# Patient Record
Sex: FEMALE | Race: OTHER | NOT HISPANIC OR LATINO
[De-identification: names, ages, dates, MRNs, and addresses within clinical notes are randomized per-mention and may not be internally consistent; named-entity substitution may affect disease eponyms.]

---
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
## 2022-01-09 ENCOUNTER — NON-APPOINTMENT (OUTPATIENT)
Age: 22
End: 2022-01-09

## 2022-01-10 ENCOUNTER — NON-APPOINTMENT (OUTPATIENT)
Age: 22
End: 2022-01-10

## 2022-01-11 ENCOUNTER — NON-APPOINTMENT (OUTPATIENT)
Age: 22
End: 2022-01-11

## 2022-01-11 ENCOUNTER — LABORATORY RESULT (OUTPATIENT)
Age: 22
End: 2022-01-11

## 2022-01-11 ENCOUNTER — ASOB RESULT (OUTPATIENT)
Age: 22
End: 2022-01-11

## 2022-01-11 ENCOUNTER — APPOINTMENT (OUTPATIENT)
Dept: OBGYN | Facility: CLINIC | Age: 22
End: 2022-01-11
Payer: MEDICAID

## 2022-01-11 ENCOUNTER — APPOINTMENT (OUTPATIENT)
Dept: ANTEPARTUM | Facility: CLINIC | Age: 22
End: 2022-01-11
Payer: MEDICAID

## 2022-01-11 PROCEDURE — 99213 OFFICE O/P EST LOW 20 MIN: CPT

## 2022-01-11 PROCEDURE — 76813 OB US NUCHAL MEAS 1 GEST: CPT

## 2022-01-11 RX ORDER — PRENATAL VIT 49/IRON FUM/FOLIC 6.75-0.2MG
TABLET ORAL
Refills: 0 | Status: ACTIVE | COMMUNITY

## 2022-01-14 LAB
AMPHET UR-MCNC: NEGATIVE
BARBITURATES UR-MCNC: NEGATIVE
BENZODIAZ UR-MCNC: NEGATIVE
COCAINE METAB.OTHER UR-MCNC: NEGATIVE
CREATININE, URINE: 200.6 MG/DL
METHADONE UR-MCNC: NEGATIVE
METHAQUALONE UR-MCNC: NEGATIVE
OPIATES UR-MCNC: NEGATIVE
PCP UR-MCNC: NEGATIVE
PROPOXYPH UR QL: NEGATIVE
THC UR QL: NEGATIVE

## 2022-01-21 LAB
CLARI ADDITIONAL INFO: NORMAL
CLARI CHROMOSOME 13: NORMAL
CLARI CHROMOSOME 18: NORMAL
CLARI CHROMOSOME 21: NORMAL
CLARI SEX CHROMOSOMES: NORMAL
CLARITEST NIPT: NORMAL
MATERNAL WEIGHT (LBS):: NORMAL
PLEASE INCLUDE GENDER RESULTS ON THIS REPORT:: NORMAL
TYPE OF PREGNANCY:: NORMAL

## 2022-01-22 ENCOUNTER — LABORATORY RESULT (OUTPATIENT)
Age: 22
End: 2022-01-22

## 2022-01-24 LAB
ABO + RH PNL BLD: NORMAL
APPEARANCE: ABNORMAL
B19V IGG SER QL IA: 4.35 INDEX
B19V IGG+IGM SER-IMP: NORMAL
B19V IGG+IGM SER-IMP: POSITIVE
B19V IGM FLD-ACNC: 0.15 INDEX
B19V IGM SER-ACNC: NEGATIVE
BASOPHILS # BLD AUTO: 0.03 K/UL
BASOPHILS NFR BLD AUTO: 0.4 %
BILIRUBIN URINE: NEGATIVE
BLOOD URINE: NEGATIVE
COLOR: YELLOW
EOSINOPHIL # BLD AUTO: 0.28 K/UL
EOSINOPHIL NFR BLD AUTO: 3.8 %
GLUCOSE BS SERPL-MCNC: 77 MG/DL
GLUCOSE QUALITATIVE U: NEGATIVE
HBV SURFACE AG SER QL: NONREACTIVE
HCT VFR BLD CALC: 37.3 %
HCV AB SER QL: NONREACTIVE
HCV S/CO RATIO: 0.1 S/CO
HGB A MFR BLD: 97.2 %
HGB A2 MFR BLD: 2.8 %
HGB BLD-MCNC: 12.4 G/DL
HGB FRACT BLD-IMP: NORMAL
IMM GRANULOCYTES NFR BLD AUTO: 0.5 %
KETONES URINE: NORMAL
LEUKOCYTE ESTERASE URINE: ABNORMAL
LYMPHOCYTES # BLD AUTO: 1.42 K/UL
LYMPHOCYTES NFR BLD AUTO: 19.3 %
MAN DIFF?: NORMAL
MCHC RBC-ENTMCNC: 30.9 PG
MCHC RBC-ENTMCNC: 33.2 GM/DL
MCV RBC AUTO: 93 FL
MEV IGG FLD QL IA: 95.9 AU/ML
MEV IGG+IGM SER-IMP: POSITIVE
MONOCYTES # BLD AUTO: 0.63 K/UL
MONOCYTES NFR BLD AUTO: 8.5 %
NEUTROPHILS # BLD AUTO: 4.97 K/UL
NEUTROPHILS NFR BLD AUTO: 67.5 %
NITRITE URINE: NEGATIVE
PH URINE: 6.5
PLATELET # BLD AUTO: 210 K/UL
PROTEIN URINE: ABNORMAL
RBC # BLD: 4.01 M/UL
RBC # FLD: 13 %
RUBV IGG FLD-ACNC: 4.4 INDEX
RUBV IGG SER-IMP: POSITIVE
RUBV IGM FLD-ACNC: <20 AU/ML
SPECIFIC GRAVITY URINE: 1.02
T3FREE SERPL-MCNC: 2.96 PG/ML
T4 FREE SERPL-MCNC: 1.2 NG/DL
TSH SERPL-ACNC: 0.81 UIU/ML
UROBILINOGEN URINE: NORMAL
VZV AB TITR SER: POSITIVE
VZV IGG SER IF-ACNC: 446.6 INDEX
VZV IGM SER IF-ACNC: <0.91 INDEX
WBC # FLD AUTO: 7.37 K/UL

## 2022-01-26 LAB
AR GENE MUT ANL BLD/T: NORMAL
MEV IGM SER QL: <0.91 ISR
RPR SER-TITR: NORMAL

## 2022-02-01 LAB — CFTR MUT TESTED BLD/T: NEGATIVE

## 2022-02-04 LAB
11-BETA-HYDROXYLASE-DEFICIENT CONGENITAL ADRENAL HYPERPLASIA: NEGATIVE
6-PYRUVOYL-TETRAHYDROPTERIN SYNTHASE DEFICIENCY: NEGATIVE
ABCC8-RELATED HYPERINSULINISM: NEGATIVE
ADENOSINE DEAMINASE DEFICIENCY: NEGATIVE
ALPHA THALASSEMIA: NEGATIVE
ALPHA-MANNOSIDOSIS: NEGATIVE
ALSTROM SYNDROME: NEGATIVE
AMT-RELATED GLYCINE ENCEPHALOPATHY: NEGATIVE
ANDERMANN SYNDROME: NEGATIVE
AR GENE MUT ANL BLD/T: NEGATIVE
ARGININEMIA: NEGATIVE
ARGININOSUCCINIC ACIDURIA: NEGATIVE
ARSACS: NEGATIVE
ASPARTYLGLYCOSAMINURIA: NEGATIVE
ATAXIA WITH VITAMIN E DEFICIENCY: NEGATIVE
ATAXIA-TELANGIECTASIA: NEGATIVE
ATP7A-RELATED DISORDERS: NEGATIVE
AUTOSOMAL RECESSIVE OSTEOPETROSIS TYPE 1: NEGATIVE
AUTOSOMAL RECESSIVE POLYCYSTIC KIDNEY DISEASE: NEGATIVE
BARDET-BIEDL SYNDROME, BBS1-RELATED: NEGATIVE
BARDET-BIEDL SYNDROME, BBS10-RELATED: NEGATIVE
BARDET-BIEDL SYNDROME, BBS12-RELATED: NEGATIVE
BARDET-BIEDL SYNDROME, BBS2-RELATED: NEGATIVE
BIOTINIDASE DEFICIENCY: NEGATIVE
BLOOM SYNDROME: NEGATIVE
CALPAINOPATHY: NEGATIVE
CANAVAN DISEASE: NEGATIVE
CARBAMOYLPHOSPHATE SYNTHETASE I DEFICIENCY: NEGATIVE
CARNITINE PALMITOYLTRANSFERASE IA DEFICIENCY: NEGATIVE
CARNITINE PALMITOYLTRANSFERASE II DEFICIENCY: NEGATIVE
CARTILAGE-HAIR HYPOPLASIA: NEGATIVE
CEREBROTENDINOUS XANTHOMATOSIS: NEGATIVE
CITRULLINEMIA TYPE 1: NEGATIVE
CLN3-RELATED NEURONAL CEROID LIPOFUSCINOSIS: NEGATIVE
CLN5-RELATED NEURONAL CEROID LIPOFUSCINOSIS: NEGATIVE
CLN6-RELATED NEURONAL CEROID LIPOFUSCINOSIS: NEGATIVE
COHEN SYNDROME: NEGATIVE
COL4A3-RELATED ALPORT SYNDROME: NEGATIVE
COL4A4-RELATED ALPORT SYNDROME: NEGATIVE
CONGENITAL ADRENAL HYPERPLASIA: NEGATIVE
CONGENITAL DISORDER OF GLYCOSYLATION TYPE IA: NEGATIVE
CONGENITAL DISORDER OF GLYCOSYLATION TYPE IB: NEGATIVE
CONGENITAL DISORDER OF GLYCOSYLATION TYPE IC: NEGATIVE
CONGENITAL FINNISH NEPHROSIS: NEGATIVE
COSTEFF OPTIC ATROPHY SYNDROME: NEGATIVE
CYSTIC FIBROSIS: NEGATIVE
CYSTINOSIS: NEGATIVE
D-BIFUNCTIONAL PROTEIN DEFICIENCY: NEGATIVE
DELTA-SARCOGLYCANOPATHY: NEGATIVE
DYS GENE MUT TESTED BLD/T: NEGATIVE
DYSFERLINOPATHY: NEGATIVE
DYSTROPHINOPATHY (INCLUDING DUCHENNE/BECKER MUSCULAR DYSTROP: NEGATIVE
ERCC6-RELATED DISORDERS: NEGATIVE
ERCC8-RELATED DISORDERS: NEGATIVE
EVC-RELATED ELLIS-VAN CREVELD SYNDROME: NEGATIVE
EVC2-RELATED ELLIS-VAN CREVELD SYNDROME: NEGATIVE
FABRY DISEASE: NEGATIVE
FAMILIAL MEDITERRANEAN FEVER: NEGATIVE
FANCONI ANEMIA COMPLEMENTATION GROUP A: NEGATIVE
FANCONI ANEMIA TYPE C: NEGATIVE
FKRP-RELATED DISORDERS: NEGATIVE
FRAGILE X SYNDROME: NEGATIVE
GALACTOKINASE DEFICIENCY: NEGATIVE
GALACTOSEMIA: NEGATIVE
GAMMA-SARCOGLYCANOPATHY: NEGATIVE
GAUCHER DISEASE: NEGATIVE
GJB2-RELATED DFNB1 NONSYNDROMIC HEARING LOSS AND DEAFNESS: NEGATIVE
GLB1-RELATED DISORDERS: NEGATIVE
GLDC-RELATED GLYCINE ENCEPHALOPATHY: NEGATIVE
GLUTARIC ACIDEMIA TYPE 1: NEGATIVE
GLYCOGEN STORAGE DISEASE TYPE IA: NEGATIVE
GLYCOGEN STORAGE DISEASE TYPE IB: NEGATIVE
GLYCOGEN STORAGE DISEASE TYPE III: NEGATIVE
GNPTAB-RELATED DISORDERS: NEGATIVE
GRACILE SYNDROME: NEGATIVE
HB BETA CHAIN-RELATED HEMOGLOBINOPATHY: NEGATIVE
HEREDITARY FRUCTOSE INTOLERANCE: NEGATIVE
HERLITZ JUNCTIONAL EPIDERMOLYSIS BULLOSA, LAMA3-RELATED: NEGATIVE
HERLITZ JUNCTIONAL EPIDERMOLYSIS BULLOSA, LAMB3-RELATED: NEGATIVE
HERLITZ JUNCTIONAL EPIDERMOLYSIS BULLOSA, LAMC2-RELATED: NEGATIVE
HEXOSAMINIDASE A DEFICIENCY: NEGATIVE
HMG-COA LYASE DEFICIENCY: NEGATIVE
HOLOCARBOXYLASE SYNTHETASE DEFICIENCY: NEGATIVE
HOMOCYSTINURIA / CYSTATHIONINE BETA-SYNTHASE DEFICIENCY: NEGATIVE
HURLER SYNDROME: NEGATIVE
HYDROLETHALUS SYNDROME: NEGATIVE
HYPOPHOSPHATASIA, AUTOSOMAL RECESSIVE: NEGATIVE
INCLUSION BODY MYOPATHY 2: NEGATIVE
ISOVALERIC ACIDEMIA: NEGATIVE
JOUBERT SYNDROME 2: NEGATIVE
KCNJ11-RELATED FAMILIAL HYPERINSULINISM: NEGATIVE
KRABBE DISEASE: NEGATIVE
LAMA2-RELATED MUSCULAR DYSTROPHY: NEGATIVE
LEIGH SYNDROME, FRENCH-CANADIAN TYPE: NEGATIVE
LIMB-GIRDLE MUSCULAR DYSTROPHY TYPE 2D: NEGATIVE
LIMB-GIRDLE MUSCULAR DYSTROPHY TYPE 2E: NEGATIVE
LIPOAMIDE DEHYDROGENASE DEFICIENCY: NEGATIVE
LIPOID CONGENITAL ADRENAL HYPERPLASIA: NEGATIVE
LONG CHAIN 3-HYDROXYACYL-COA DEHYDROGENASE DEFICIENCY: NEGATIVE
LYSOSOMAL ACID LIPASE DEFICIENCY: NEGATIVE
MAPLE SYRUP URINE DISEASE TYPE 1B: NEGATIVE
MAPLE SYRUP URINE DISEASE TYPE IA: NEGATIVE
MAPLE SYRUP URINE DISEASE TYPE II: NEGATIVE
MEDIUM CHAIN ACYL-COA DEHYDROGENASE DEFICIENCY: NEGATIVE
MEGALENCEPHALIC LEUKOENCEPHALOPATHY WITH SUBCORTICAL CYSTS: NEGATIVE
METACHROMATIC LEUKODYSTROPHY: NEGATIVE
METHYLMALONIC ACIDEMIA, CBLA TYPE: NEGATIVE
METHYLMALONIC ACIDEMIA, CBLB TYPE: NEGATIVE
METHYLMALONIC ACIDURIA AND HOMOCYSTINURIA, CBLC TYPE: NEGATIVE
MKS1-RELATED DISORDERS: NEGATIVE
MUCOLIPIDOSIS III GAMMA: NEGATIVE
MUCOLIPIDOSIS IV: NEGATIVE
MUCOPOLYSACCHARIDOSIS TYPE II: NEGATIVE
MUCOPOLYSACCHARIDOSIS TYPE IIIA: NEGATIVE
MUCOPOLYSACCHARIDOSIS TYPE IIIB: NEGATIVE
MUCOPOLYSACCHARIDOSIS TYPE IIIC: NEGATIVE
MUSCLE-EYE-BRAIN DISEASE: NEGATIVE
MUT-RELATED METHYLMALONIC ACIDEMIA: NEGATIVE
MYO7A-RELATED DISORDERS: NEGATIVE
NEB-RELATED NEMALINE MYOPATHY: NEGATIVE
NIEMANN-PICK DISEASE TYPE C2: NEGATIVE
NIEMANN-PICK DISEASE TYPE C: NEGATIVE
NIEMANN-PICK DISEASE, SMPD1-ASSOCIATED: NEGATIVE
NIJMEGEN BREAKAGE SYNDROME: NEGATIVE
NORTHERN EPILEPSY: NEGATIVE
ORNITHINE TRANSCARBAMYLASE DEFICIENCY: NEGATIVE
PCCA-RELATED PROPIONIC ACIDEMIA: NEGATIVE
PCCB-RELATED PROPIONIC ACIDEMIA: NEGATIVE
PENDRED SYNDROME: NEGATIVE
PEROXISOME BIOGENESIS DISORDER TYPE 3: NEGATIVE
PEROXISOME BIOGENESIS DISORDER TYPE 4: NEGATIVE
PEROXISOME BIOGENESIS DISORDER TYPE 5: NEGATIVE
PEROXISOME BIOGENESIS DISORDER TYPE 6: NEGATIVE
PEX1-RELATED ZELLWEGER SYNDROME SPECTRUM: NEGATIVE
PHENYLALANINE HYDROXYLASE DEFICIENCY: NEGATIVE
POLYGLANDULAR AUTOIMMUNE SYNDROME TYPE 1: NEGATIVE
POMPE DISEASE: NEGATIVE
PPT1-RELATED NEURONAL CEROID LIPOFUSCINOSIS: NEGATIVE
PRIMARY CARNITINE DEFICIENCY: NEGATIVE
PRIMARY HYPEROXALURIA TYPE 1: NEGATIVE
PRIMARY HYPEROXALURIA TYPE 2: NEGATIVE
PRIMARY HYPEROXALURIA TYPE 3: NEGATIVE
PROP1-RELATED COMBINED PITUITARY HORMONE DEFICIENCY: NEGATIVE
PYCNODYSOSTOSIS: NEGATIVE
PYRUVATE CARBOXYLASE DEFICIENCY: NEGATIVE
RHIZOMELIC CHONDRODYSPLASIA PUNCTATA TYPE 1: NEGATIVE
RTEL1-RELATED DISORDERS: NEGATIVE
SALLA DISEASE: NEGATIVE
SANDHOFF DISEASE: NEGATIVE
SEGAWA SYNDROME: NEGATIVE
SHORT CHAIN ACYL-COA DEHYDROGENASE DEFICIENCY: NEGATIVE
SJOGREN-LARSSON SYNDROME: NEGATIVE
SMITH-LEMLI-OPITZ SYNDROME: NEGATIVE
SPASTIC PARAPLEGIA TYPE 15: NEGATIVE
SPONDYLOTHORACIC DYSOSTOSIS: NEGATIVE
STEROID-RESISTANT NEPHROTIC SYNDROME: NEGATIVE
SULFATE TRANSPORTER-RELATED OSTEOCHONDRODYSPLASIA: NEGATIVE
TGM1-RELATED AUTOSOMAL RECESSIVE CONGENITAL ICHTHYOSIS: NEGATIVE
TPP1-RELATED NEURONAL CEROID LIPOFUSCINOSIS: NEGATIVE
TYROSINEMIA TYPE I: NEGATIVE
TYROSINEMIA TYPE II: NEGATIVE
USH1C-RELATED DISORDERS: NEGATIVE
USH2A-RELATED DISORDERS: NEGATIVE
USHER SYNDROME TYPE 1F: NEGATIVE
USHER SYNDROME TYPE 3: NEGATIVE
VERY LONG CHAIN ACYL-COA DEHYDROGENASE DEFICIENCY: NEGATIVE
WALKER-WARBURG SYNDROME: NEGATIVE
WILSON DISEASE: NEGATIVE
X-LINKED ADRENOLEUKODYSTROPHY: NEGATIVE
X-LINKED ALPORT SYNDROME: NEGATIVE
X-LINKED CONGENITAL ADRENAL HYPOPLASIA: NEGATIVE
X-LINKED JUVENILE RETINOSCHISIS: NEGATIVE
X-LINKED MYOTUBULAR MYOPATHY: NEGATIVE
X-LINKED SEVERE COMBINED IMMUNODEFICIENCY: NEGATIVE
XERODERMA PIGMENTOSUM GROUP A: NEGATIVE
XERODERMA PIGMENTOSUM GROUP C: NEGATIVE

## 2022-02-07 ENCOUNTER — NON-APPOINTMENT (OUTPATIENT)
Age: 22
End: 2022-02-07

## 2022-02-08 ENCOUNTER — APPOINTMENT (OUTPATIENT)
Dept: OBGYN | Facility: CLINIC | Age: 22
End: 2022-02-08
Payer: MEDICAID

## 2022-02-08 ENCOUNTER — NON-APPOINTMENT (OUTPATIENT)
Age: 22
End: 2022-02-08

## 2022-02-08 PROCEDURE — 99213 OFFICE O/P EST LOW 20 MIN: CPT

## 2022-02-15 ENCOUNTER — ASOB RESULT (OUTPATIENT)
Age: 22
End: 2022-02-15

## 2022-02-15 ENCOUNTER — APPOINTMENT (OUTPATIENT)
Dept: ANTEPARTUM | Facility: CLINIC | Age: 22
End: 2022-02-15
Payer: MEDICAID

## 2022-02-15 PROCEDURE — 76805 OB US >/= 14 WKS SNGL FETUS: CPT

## 2022-03-01 ENCOUNTER — NON-APPOINTMENT (OUTPATIENT)
Age: 22
End: 2022-03-01

## 2022-03-01 ENCOUNTER — APPOINTMENT (OUTPATIENT)
Dept: ANTEPARTUM | Facility: CLINIC | Age: 22
End: 2022-03-01
Payer: MEDICAID

## 2022-03-01 ENCOUNTER — ASOB RESULT (OUTPATIENT)
Age: 22
End: 2022-03-01

## 2022-03-01 PROCEDURE — 76816 OB US FOLLOW-UP PER FETUS: CPT

## 2022-03-07 ENCOUNTER — NON-APPOINTMENT (OUTPATIENT)
Age: 22
End: 2022-03-07

## 2022-03-08 ENCOUNTER — APPOINTMENT (OUTPATIENT)
Dept: OBGYN | Facility: CLINIC | Age: 22
End: 2022-03-08
Payer: MEDICAID

## 2022-03-08 LAB
GLUCOSE UR-MCNC: NEGATIVE
PROT UR STRIP-MCNC: NEGATIVE

## 2022-03-08 PROCEDURE — 99213 OFFICE O/P EST LOW 20 MIN: CPT

## 2022-04-05 ENCOUNTER — NON-APPOINTMENT (OUTPATIENT)
Age: 22
End: 2022-04-05

## 2022-04-05 ENCOUNTER — APPOINTMENT (OUTPATIENT)
Dept: OBGYN | Facility: CLINIC | Age: 22
End: 2022-04-05
Payer: MEDICAID

## 2022-04-05 LAB
GLUCOSE UR-MCNC: NEGATIVE
PROT UR STRIP-MCNC: NORMAL

## 2022-04-05 PROCEDURE — 99213 OFFICE O/P EST LOW 20 MIN: CPT | Mod: TH

## 2022-05-02 ENCOUNTER — NON-APPOINTMENT (OUTPATIENT)
Age: 22
End: 2022-05-02

## 2022-05-03 ENCOUNTER — APPOINTMENT (OUTPATIENT)
Dept: OBGYN | Facility: CLINIC | Age: 22
End: 2022-05-03
Payer: MEDICAID

## 2022-05-03 ENCOUNTER — NON-APPOINTMENT (OUTPATIENT)
Age: 22
End: 2022-05-03

## 2022-05-03 LAB
BASOPHILS # BLD AUTO: 0.05 K/UL
BASOPHILS NFR BLD AUTO: 0.6 %
EOSINOPHIL # BLD AUTO: 0.22 K/UL
EOSINOPHIL NFR BLD AUTO: 2.6 %
GLUCOSE 1H P 50 G GLC PO SERPL-MCNC: 66 MG/DL
HCT VFR BLD CALC: 32.9 %
HGB BLD-MCNC: 10.4 G/DL
IMM GRANULOCYTES NFR BLD AUTO: 1.3 %
LYMPHOCYTES # BLD AUTO: 1.41 K/UL
LYMPHOCYTES NFR BLD AUTO: 16.9 %
MAN DIFF?: NORMAL
MCHC RBC-ENTMCNC: 29.6 PG
MCHC RBC-ENTMCNC: 31.6 GM/DL
MCV RBC AUTO: 93.7 FL
MONOCYTES # BLD AUTO: 0.77 K/UL
MONOCYTES NFR BLD AUTO: 9.3 %
NEUTROPHILS # BLD AUTO: 5.76 K/UL
NEUTROPHILS NFR BLD AUTO: 69.3 %
PLATELET # BLD AUTO: 220 K/UL
RBC # BLD: 3.51 M/UL
RBC # FLD: 12.8 %
WBC # FLD AUTO: 8.32 K/UL

## 2022-05-03 PROCEDURE — 99213 OFFICE O/P EST LOW 20 MIN: CPT | Mod: TH

## 2022-05-03 RX ORDER — FERROUS SULFATE 137(45) MG
142 (45 FE) TABLET, EXTENDED RELEASE ORAL
Qty: 90 | Refills: 0 | Status: ACTIVE | COMMUNITY
Start: 2022-05-03

## 2022-05-03 RX ORDER — MULTIVIT-MIN/IRON/FOLIC ACID/K 18-600-40
500 CAPSULE ORAL
Refills: 0 | Status: ACTIVE | COMMUNITY
Start: 2022-05-03

## 2022-05-24 ENCOUNTER — APPOINTMENT (OUTPATIENT)
Dept: ANTEPARTUM | Facility: CLINIC | Age: 22
End: 2022-05-24
Payer: MEDICAID

## 2022-05-24 ENCOUNTER — ASOB RESULT (OUTPATIENT)
Age: 22
End: 2022-05-24

## 2022-05-24 ENCOUNTER — APPOINTMENT (OUTPATIENT)
Dept: OBGYN | Facility: CLINIC | Age: 22
End: 2022-05-24
Payer: MEDICAID

## 2022-05-24 ENCOUNTER — NON-APPOINTMENT (OUTPATIENT)
Age: 22
End: 2022-05-24

## 2022-05-24 LAB
GLUCOSE UR-MCNC: NORMAL
PROT UR STRIP-MCNC: NORMAL

## 2022-05-24 PROCEDURE — 76820 UMBILICAL ARTERY ECHO: CPT

## 2022-05-24 PROCEDURE — 99213 OFFICE O/P EST LOW 20 MIN: CPT | Mod: TH

## 2022-05-24 PROCEDURE — 76819 FETAL BIOPHYS PROFIL W/O NST: CPT

## 2022-05-24 PROCEDURE — 76821 MIDDLE CEREBRAL ARTERY ECHO: CPT

## 2022-05-24 PROCEDURE — 76816 OB US FOLLOW-UP PER FETUS: CPT

## 2022-05-27 ENCOUNTER — NON-APPOINTMENT (OUTPATIENT)
Age: 22
End: 2022-05-27

## 2022-05-31 ENCOUNTER — APPOINTMENT (OUTPATIENT)
Dept: ANTEPARTUM | Facility: CLINIC | Age: 22
End: 2022-05-31
Payer: MEDICAID

## 2022-05-31 ENCOUNTER — ASOB RESULT (OUTPATIENT)
Age: 22
End: 2022-05-31

## 2022-05-31 PROCEDURE — ZZZZZ: CPT

## 2022-05-31 PROCEDURE — 76818 FETAL BIOPHYS PROFILE W/NST: CPT

## 2022-06-07 ENCOUNTER — NON-APPOINTMENT (OUTPATIENT)
Age: 22
End: 2022-06-07

## 2022-06-07 ENCOUNTER — APPOINTMENT (OUTPATIENT)
Dept: ANTEPARTUM | Facility: CLINIC | Age: 22
End: 2022-06-07
Payer: MEDICAID

## 2022-06-07 ENCOUNTER — ASOB RESULT (OUTPATIENT)
Age: 22
End: 2022-06-07

## 2022-06-07 ENCOUNTER — APPOINTMENT (OUTPATIENT)
Dept: OBGYN | Facility: CLINIC | Age: 22
End: 2022-06-07
Payer: MEDICAID

## 2022-06-07 LAB
GLUCOSE UR-MCNC: NEGATIVE
PROT UR STRIP-MCNC: NEGATIVE

## 2022-06-07 PROCEDURE — 76818 FETAL BIOPHYS PROFILE W/NST: CPT

## 2022-06-07 PROCEDURE — 76820 UMBILICAL ARTERY ECHO: CPT

## 2022-06-07 PROCEDURE — ZZZZZ: CPT

## 2022-06-07 PROCEDURE — 76821 MIDDLE CEREBRAL ARTERY ECHO: CPT

## 2022-06-07 PROCEDURE — 99213 OFFICE O/P EST LOW 20 MIN: CPT | Mod: TH

## 2022-06-14 ENCOUNTER — ASOB RESULT (OUTPATIENT)
Age: 22
End: 2022-06-14

## 2022-06-14 ENCOUNTER — APPOINTMENT (OUTPATIENT)
Dept: ANTEPARTUM | Facility: CLINIC | Age: 22
End: 2022-06-14

## 2022-06-17 ENCOUNTER — NON-APPOINTMENT (OUTPATIENT)
Age: 22
End: 2022-06-17

## 2022-06-20 ENCOUNTER — NON-APPOINTMENT (OUTPATIENT)
Age: 22
End: 2022-06-20

## 2022-06-21 ENCOUNTER — APPOINTMENT (OUTPATIENT)
Dept: ANTEPARTUM | Facility: CLINIC | Age: 22
End: 2022-06-21

## 2022-06-21 ENCOUNTER — LABORATORY RESULT (OUTPATIENT)
Age: 22
End: 2022-06-21

## 2022-06-21 ENCOUNTER — APPOINTMENT (OUTPATIENT)
Dept: OBGYN | Facility: CLINIC | Age: 22
End: 2022-06-21
Payer: MEDICAID

## 2022-06-21 PROCEDURE — 99213 OFFICE O/P EST LOW 20 MIN: CPT | Mod: TH

## 2022-06-22 LAB
GLUCOSE UR-MCNC: NEGATIVE
LEUKOCYTE ESTERASE UR QL STRIP: NORMAL
NITRITE UR QL STRIP: NEGATIVE
PROT UR STRIP-MCNC: NORMAL

## 2022-06-24 LAB
GP B STREP DNA SPEC QL NAA+PROBE: DETECTED
GP B STREP DNA SPEC QL NAA+PROBE: NORMAL
SOURCE GBS: NORMAL

## 2022-06-28 ENCOUNTER — APPOINTMENT (OUTPATIENT)
Dept: ANTEPARTUM | Facility: CLINIC | Age: 22
End: 2022-06-28

## 2022-06-28 ENCOUNTER — ASOB RESULT (OUTPATIENT)
Age: 22
End: 2022-06-28

## 2022-06-28 PROCEDURE — 76816 OB US FOLLOW-UP PER FETUS: CPT

## 2022-06-29 ENCOUNTER — NON-APPOINTMENT (OUTPATIENT)
Age: 22
End: 2022-06-29

## 2022-06-30 ENCOUNTER — NON-APPOINTMENT (OUTPATIENT)
Age: 22
End: 2022-06-30

## 2022-07-05 ENCOUNTER — APPOINTMENT (OUTPATIENT)
Dept: ANTEPARTUM | Facility: CLINIC | Age: 22
End: 2022-07-05

## 2022-07-05 ENCOUNTER — APPOINTMENT (OUTPATIENT)
Dept: OBGYN | Facility: CLINIC | Age: 22
End: 2022-07-05

## 2022-07-05 VITALS
BODY MASS INDEX: 30.36 KG/M2 | WEIGHT: 165 LBS | DIASTOLIC BLOOD PRESSURE: 60 MMHG | SYSTOLIC BLOOD PRESSURE: 116 MMHG | HEIGHT: 62 IN

## 2022-07-05 PROCEDURE — 99213 OFFICE O/P EST LOW 20 MIN: CPT | Mod: TH

## 2022-07-12 ENCOUNTER — APPOINTMENT (OUTPATIENT)
Dept: ANTEPARTUM | Facility: CLINIC | Age: 22
End: 2022-07-12

## 2022-07-12 ENCOUNTER — APPOINTMENT (OUTPATIENT)
Dept: OBGYN | Facility: CLINIC | Age: 22
End: 2022-07-12

## 2022-07-12 VITALS
HEIGHT: 62 IN | WEIGHT: 161 LBS | SYSTOLIC BLOOD PRESSURE: 118 MMHG | DIASTOLIC BLOOD PRESSURE: 60 MMHG | BODY MASS INDEX: 29.63 KG/M2

## 2022-07-12 LAB
CLARITY UR: CLEAR
COLLECTION METHOD: NORMAL
GLUCOSE UR-MCNC: NEGATIVE
LEUKOCYTE ESTERASE UR QL STRIP: NORMAL
NITRITE UR QL STRIP: NEGATIVE
PROT UR STRIP-MCNC: NORMAL

## 2022-07-12 PROCEDURE — 99213 OFFICE O/P EST LOW 20 MIN: CPT | Mod: TH

## 2022-07-12 RX ORDER — TERCONAZOLE 8 MG/G
0.8 CREAM VAGINAL
Qty: 1 | Refills: 0 | Status: COMPLETED | COMMUNITY
Start: 2022-02-08 | End: 2022-07-12

## 2022-07-18 ENCOUNTER — NON-APPOINTMENT (OUTPATIENT)
Age: 22
End: 2022-07-18

## 2022-07-19 ENCOUNTER — APPOINTMENT (OUTPATIENT)
Dept: OBGYN | Facility: CLINIC | Age: 22
End: 2022-07-19

## 2022-07-19 PROCEDURE — 99213 OFFICE O/P EST LOW 20 MIN: CPT | Mod: TH

## 2022-07-20 ENCOUNTER — INPATIENT (INPATIENT)
Facility: HOSPITAL | Age: 22
LOS: 2 days | Discharge: HOME | End: 2022-07-23
Attending: OBSTETRICS & GYNECOLOGY | Admitting: OBSTETRICS & GYNECOLOGY
Payer: COMMERCIAL

## 2022-07-20 VITALS
RESPIRATION RATE: 18 BRPM | HEART RATE: 66 BPM | SYSTOLIC BLOOD PRESSURE: 108 MMHG | DIASTOLIC BLOOD PRESSURE: 57 MMHG | TEMPERATURE: 98 F

## 2022-07-20 DIAGNOSIS — O26.893 OTHER SPECIFIED PREGNANCY RELATED CONDITIONS, THIRD TRIMESTER: ICD-10-CM

## 2022-07-20 DIAGNOSIS — O47.1 FALSE LABOR AT OR AFTER 37 COMPLETED WEEKS OF GESTATION: ICD-10-CM

## 2022-07-20 LAB
BASOPHILS # BLD AUTO: 0.03 K/UL — SIGNIFICANT CHANGE UP (ref 0–0.2)
BASOPHILS NFR BLD AUTO: 0.4 % — SIGNIFICANT CHANGE UP (ref 0–2)
BLD GP AB SCN SERPL QL: SIGNIFICANT CHANGE UP
COVID-19 SPIKE DOMAIN AB INTERP: POSITIVE
COVID-19 SPIKE DOMAIN ANTIBODY RESULT: >250 U/ML — HIGH
EOSINOPHIL # BLD AUTO: 0.06 K/UL — SIGNIFICANT CHANGE UP (ref 0–0.5)
EOSINOPHIL NFR BLD AUTO: 0.8 % — SIGNIFICANT CHANGE UP (ref 0–6)
HCT VFR BLD CALC: 33.7 % — LOW (ref 34.5–45)
HGB BLD-MCNC: 10.7 G/DL — LOW (ref 11.5–15.5)
HIV 1 & 2 AB SERPL IA.RAPID: SIGNIFICANT CHANGE UP
IMM GRANULOCYTES NFR BLD AUTO: 0.8 % — SIGNIFICANT CHANGE UP (ref 0–1.5)
LYMPHOCYTES # BLD AUTO: 1.62 K/UL — SIGNIFICANT CHANGE UP (ref 1–3.3)
LYMPHOCYTES # BLD AUTO: 21.5 % — SIGNIFICANT CHANGE UP (ref 13–44)
MCHC RBC-ENTMCNC: 25.8 PG — LOW (ref 27–34)
MCHC RBC-ENTMCNC: 31.8 GM/DL — LOW (ref 32–36)
MCV RBC AUTO: 81.4 FL — SIGNIFICANT CHANGE UP (ref 80–100)
MONOCYTES # BLD AUTO: 0.68 K/UL — SIGNIFICANT CHANGE UP (ref 0–0.9)
MONOCYTES NFR BLD AUTO: 9 % — SIGNIFICANT CHANGE UP (ref 2–14)
NEUTROPHILS # BLD AUTO: 5.09 K/UL — SIGNIFICANT CHANGE UP (ref 1.8–7.4)
NEUTROPHILS NFR BLD AUTO: 67.5 % — SIGNIFICANT CHANGE UP (ref 43–77)
PLATELET # BLD AUTO: 189 K/UL — SIGNIFICANT CHANGE UP (ref 150–400)
RBC # BLD: 4.14 M/UL — SIGNIFICANT CHANGE UP (ref 3.8–5.2)
RBC # FLD: 14.9 % — HIGH (ref 10.3–14.5)
SARS-COV-2 IGG+IGM SERPL QL IA: >250 U/ML — HIGH
SARS-COV-2 IGG+IGM SERPL QL IA: POSITIVE
SARS-COV-2 RNA SPEC QL NAA+PROBE: SIGNIFICANT CHANGE UP
WBC # BLD: 7.54 K/UL — SIGNIFICANT CHANGE UP (ref 3.8–10.5)
WBC # FLD AUTO: 7.54 K/UL — SIGNIFICANT CHANGE UP (ref 3.8–10.5)

## 2022-07-20 RX ORDER — CITRIC ACID/SODIUM CITRATE 300-500 MG
15 SOLUTION, ORAL ORAL EVERY 6 HOURS
Refills: 0 | Status: DISCONTINUED | OUTPATIENT
Start: 2022-07-20 | End: 2022-07-21

## 2022-07-20 RX ORDER — CHLORHEXIDINE GLUCONATE 213 G/1000ML
1 SOLUTION TOPICAL ONCE
Refills: 0 | Status: DISCONTINUED | OUTPATIENT
Start: 2022-07-20 | End: 2022-07-21

## 2022-07-20 RX ORDER — SODIUM CHLORIDE 9 MG/ML
1000 INJECTION, SOLUTION INTRAVENOUS
Refills: 0 | Status: DISCONTINUED | OUTPATIENT
Start: 2022-07-20 | End: 2022-07-21

## 2022-07-20 RX ORDER — OXYTOCIN 10 UNIT/ML
VIAL (ML) INJECTION
Qty: 30 | Refills: 0 | Status: DISCONTINUED | OUTPATIENT
Start: 2022-07-20 | End: 2022-07-21

## 2022-07-20 RX ORDER — BUTORPHANOL TARTRATE 2 MG/ML
2 INJECTION, SOLUTION INTRAMUSCULAR; INTRAVENOUS ONCE
Refills: 0 | Status: DISCONTINUED | OUTPATIENT
Start: 2022-07-20 | End: 2022-07-20

## 2022-07-20 RX ORDER — OXYTOCIN 10 UNIT/ML
333.33 VIAL (ML) INJECTION
Qty: 20 | Refills: 0 | Status: COMPLETED | OUTPATIENT
Start: 2022-07-20 | End: 2022-07-20

## 2022-07-20 RX ORDER — AMPICILLIN TRIHYDRATE 250 MG
2 CAPSULE ORAL ONCE
Refills: 0 | Status: COMPLETED | OUTPATIENT
Start: 2022-07-20 | End: 2022-07-20

## 2022-07-20 RX ORDER — AMPICILLIN TRIHYDRATE 250 MG
1 CAPSULE ORAL EVERY 4 HOURS
Refills: 0 | Status: DISCONTINUED | OUTPATIENT
Start: 2022-07-20 | End: 2022-07-21

## 2022-07-20 RX ADMIN — BUTORPHANOL TARTRATE 2 MILLIGRAM(S): 2 INJECTION, SOLUTION INTRAMUSCULAR; INTRAVENOUS at 16:25

## 2022-07-20 RX ADMIN — Medication 108 GRAM(S): at 23:45

## 2022-07-20 RX ADMIN — BUTORPHANOL TARTRATE 2 MILLIGRAM(S): 2 INJECTION, SOLUTION INTRAMUSCULAR; INTRAVENOUS at 15:55

## 2022-07-20 RX ADMIN — Medication 200 GRAM(S): at 15:47

## 2022-07-20 RX ADMIN — SODIUM CHLORIDE 125 MILLILITER(S): 9 INJECTION, SOLUTION INTRAVENOUS at 15:30

## 2022-07-20 RX ADMIN — Medication 108 GRAM(S): at 19:54

## 2022-07-20 NOTE — OB RN DELIVERY SUMMARY - NS_TRANSFUSREACT_OBGYN_ALL_OB
No

## 2022-07-20 NOTE — OB RN TRIAGE NOTE - FALL HARM RISK - UNIVERSAL INTERVENTIONS
Bed in lowest position, wheels locked, appropriate side rails in place/Call bell, personal items and telephone in reach/Instruct patient to call for assistance before getting out of bed or chair/Non-slip footwear when patient is out of bed/[place] to call system/Physically safe environment - no spills, clutter or unnecessary equipment/Purposeful Proactive Rounding/Room/bathroom lighting operational, light cord in reach

## 2022-07-20 NOTE — OB PROVIDER LABOR PROGRESS NOTE - ASSESSMENT
Pt admitted in labor. Patient resting with minimal discomfort with contractions after stadol administration.      - VSS   - FHR intermittent Cat.  II tracing, likely 2/2 stadol administration   - Fetus responsive to repositioning   - Tocometer: infrequent     Plan:   - Continue expectant management   - Re-evaluate per patient symptoms or as indicated by continuous fetal monitoring    Will discuss with Dr. Gonzalez  
Cat I tracing  AROM, clear  Patient declines pain medication at this time, epidural available at her request   Will continue to monitor

## 2022-07-20 NOTE — OB RN TRIAGE NOTE - COMFORT/ACCEPTABLE PAIN LEVEL (0-10)
7 I personally saw the patient with the PA, and completed the key components of the history and physical exam. I then discussed the management plan with the PA.    53yo with psh meniscus repair presents with right knee pain after working around the garage and twisted his knee suddenly and started to have pain. neg drawer test, FROM but with pain, distal pulses normal bilaterally. no acute findings on xray, concern for meniscal injury, knee immobilizer and crutches follow up with ortho

## 2022-07-20 NOTE — OB PROVIDER H&P - HISTORY OF PRESENT ILLNESS
ARTEM LEON is a 22y  at 40w1D gestation by 1st trimester sono presenting for contractions and possible labor. Patient notes having contractions starting this morning around 3am. The contractions occur about every 10 minutes but can be as fast as every 5-6min, and are painful and last <1min. The patient also notes that yesterday she lost her mucous plug as well as has some vaginal spotting yesterday which has progressed to slight clot release today, but the bleeding is nonpainful. Patient saw her provider yesterday and was told she was 2cm dilated already. Patient denies any leakage of fluid with normal fetal movements. Patient denies headache, visual changes, fever, chills, SOB, chest/abdominal pain, or N/V/D.    Prenatal course complicated Fe def anemia treated with oral Fe not requiriing IV infusion    OBhx:    spontaneous abortino at 4-5wk of gestation    PGYNhx: - cysts, fibroids, abnormal paps, STI  PMH: asthma  PSH: none  Med: PNV, albuterol used 1/month when patient notices wheezing  Allergies: none  Soc: patient denies alcohol,cigarette, drug use during pregnancy ARTEM LEON is a 22y  at 40w1D gestation by 1st trimester sono (SYLVIE 22) presenting w/ complaints of contraction pain x 1 day.    Patient notes having contractions starting this morning around 3am. The contractions occur about every 10 minutes but can be as fast as every 5-6min, and are painful and last <1min. The patient also notes that yesterday she lost her mucous plug as well as has some vaginal spotting yesterday after cervical exam by Dr. Garza.  Patient saw her provider yesterday and was told she was 2cm dilated already. Patient denies any leakage of fluid with normal fetal movements. Patient denies headache, visual changes, fever, chills, SOB, chest/abdominal pain, or N/V/D.    Prenatal course complicated:  - Iron def anemia treated with oral Fe not requiring IV infusion    OBhx:   - SAB @ 4-5wk of gestation    PGYNhx: - cysts, fibroids, abnormal paps, STI  PMH: asthma ( no recent hospitalization)  PSH: none  Med: PNV, albuterol used 1/month when patient notices wheezing  Allergies: none  Soc: patient denies alcohol,cigarette, drug use during pregnancy

## 2022-07-20 NOTE — OB PROVIDER LABOR PROGRESS NOTE - NS_OBIHIFHRDETAILS_OBGYN_ALL_OB_FT
110 bpm, periods of minimal variability; otherwise moderate variability, + acels, no decelerations
baseline 120, moderate variability, +accels, -decels

## 2022-07-20 NOTE — OB RN PATIENT PROFILE - FALL HARM RISK - UNIVERSAL INTERVENTIONS
Bed in lowest position, wheels locked, appropriate side rails in place/Call bell, personal items and telephone in reach/Instruct patient to call for assistance before getting out of bed or chair/Non-slip footwear when patient is out of bed/[place] to call system/Physically safe environment - no spills, clutter or unnecessary equipment/Purposeful Proactive Rounding/Room/bathroom lighting operational, light cord in reach

## 2022-07-20 NOTE — OB RN DELIVERY SUMMARY - NSSELHIDDEN_OBGYN_ALL_OB_FT
[NS_DeliveryAttending1_OBGYN_ALL_OB_FT:[id number]],[NS_DeliveryAssist1_OBGYN_ALL_OB_FT:[id number]],[NS_DeliveryAssist2_OBGYN_ALL_OB_FT:[id number]=]

## 2022-07-20 NOTE — OB RN TRIAGE NOTE - NS_MEANSOFARRIVAL_OBGYN_ALL_OB
Ambulatory Azithromycin Pregnancy And Lactation Text: This medication is considered safe during pregnancy and is also secreted in breast milk.

## 2022-07-20 NOTE — OB PROVIDER H&P - NSHPPHYSICALEXAM_GEN_ALL_CORE
General: AOx3, NAD  Heart: RRR, normal S1/S2, no murmur  Lungs: CTAB  Abd: Soft, nontender, gravid  SVE: 3/60 /-3        Vitals:   HR: 66  BP: 108/57  T: 36.9      FHT: ***bpm - category I tracing.  [place]: Ctxs every *** minutes.     Sono: vtx General: AOx3, NAD  Heart: RRR, normal S1/S2, no murmur  Lungs: CTAB  Abd: Soft, nontender, gravid  SVE: 3/60 /-3        Vitals:   HR: 66  BP: 108/57  T: 36.9      FHT: 125bpm, moderate variability, present accerlations without decelerations  [place]: Ctxs every 8 minutes.     Sono: vtx ICU Vital Signs Last 24 Hrs  T(C): 36.9 (20 Jul 2022 15:21), Max: 36.9 (20 Jul 2022 13:54)  T(F): 98.4 (20 Jul 2022 15:21), Max: 98.42 (20 Jul 2022 14:01)  HR: 66 (20 Jul 2022 13:58) (66 - 66)  BP: 108/57 (20 Jul 2022 13:58) (108/57 - 108/57)  RR: 18 (20 Jul 2022 13:54) (18 - 18)        General: AOx3, NAD  Heart: RRR, normal S1/S2, no murmur  Lungs: CTAB  Abd: Soft, nontender, gravid  SVE: 3/60 /-3  US: Cephalic, posterior placenta      FHT: 125bpm, moderate variability, present accelerations without decelerations  [place]: Ctxs every 8 minutes.

## 2022-07-20 NOTE — OB RN PATIENT PROFILE - NS_SOCIALWORKCONS_OBGYN_ALL_OB
Called and informed Pt that WILLIAM advised she keep appt for 12/19 and call with any changes and will be seen in office sooner if needed. Pt thanked caller. No

## 2022-07-20 NOTE — OB PROVIDER H&P - ASSESSMENT
ARTEM LEON is a 22y  at 40w1d per 1st semester sono presenting for IOL for contractions.    - Consent  - Admission labs  - Start misprostolol to further induce  - Patient didn't want Epidural currently but will reasses  - Continuous toco/FHT  - Maternal/fetal status reassuring  - Admit to L&D      Patient H&P done by Ulises Meade ([id number])    D/w  ARTEM LEON is a 22y  at 40w1d per 1st semester sono presenting for IOL for contractions.    - Consent  - Admission labs  - Start misprostolol to further induce  -GBS+ will treat with IV penacilin  - Patient didn't want Epidural currently but will reasses  - Continuous toco/FHT  - Maternal/fetal status reassuring  - Admit to L&D      Patient H&P done by Ulises Meade (MS3)    D/w  ARTEM LEON is a 22y  at 40w1d per 1st semester sono presenting to L&D w/ contraction like pain; admin for labor    PLAN:  - Admit to L&D  - Consent  - Admission labs  - GBS positive; will start on ampicillin for ppx  - COVID-19 pending; asymptomatic at bedside. S/P vaccine  - Does not want epidural; however, would like stadol  - Reactive tracing; Irregular ctx on toco  - Will start pitocin for augmentation. Will continue to monitor  - Continuous toco/FHT  - Maternal/fetal status reassuring      Plan d/w Dr. Garza and Dr. Gonzalez

## 2022-07-20 NOTE — OB RN DELIVERY SUMMARY - NS_SEPSISRSKCALC_OBGYN_ALL_OB_FT
EOS calculated successfully. EOS Risk Factor: 0.5/1000 live births ([facility] national incidence); GA=40w2d; Temp=99.86; ROM=11.333; GBS='Positive'; Antibiotics='Broad spectrum antibiotics 2-3.9 hrs prior to birth'

## 2022-07-21 ENCOUNTER — TRANSCRIPTION ENCOUNTER (OUTPATIENT)
Age: 22
End: 2022-07-21

## 2022-07-21 LAB
HIV 1+2 AB+HIV1 P24 AG SERPL QL IA: SIGNIFICANT CHANGE UP
T PALLIDUM AB TITR SER: NEGATIVE — SIGNIFICANT CHANGE UP

## 2022-07-21 PROCEDURE — 59409 OBSTETRICAL CARE: CPT | Mod: U9

## 2022-07-21 RX ORDER — MAGNESIUM HYDROXIDE 400 MG/1
30 TABLET, CHEWABLE ORAL
Refills: 0 | Status: DISCONTINUED | OUTPATIENT
Start: 2022-07-21 | End: 2022-07-23

## 2022-07-21 RX ORDER — TETANUS TOXOID, REDUCED DIPHTHERIA TOXOID AND ACELLULAR PERTUSSIS VACCINE, ADSORBED 5; 2.5; 8; 8; 2.5 [IU]/.5ML; [IU]/.5ML; UG/.5ML; UG/.5ML; UG/.5ML
0.5 SUSPENSION INTRAMUSCULAR ONCE
Refills: 0 | Status: COMPLETED | OUTPATIENT
Start: 2022-07-21

## 2022-07-21 RX ORDER — HYDROCORTISONE 1 %
1 OINTMENT (GRAM) TOPICAL EVERY 6 HOURS
Refills: 0 | Status: DISCONTINUED | OUTPATIENT
Start: 2022-07-21 | End: 2022-07-23

## 2022-07-21 RX ORDER — KETOROLAC TROMETHAMINE 30 MG/ML
30 SYRINGE (ML) INJECTION ONCE
Refills: 0 | Status: DISCONTINUED | OUTPATIENT
Start: 2022-07-21 | End: 2022-07-21

## 2022-07-21 RX ORDER — SIMETHICONE 80 MG/1
80 TABLET, CHEWABLE ORAL EVERY 4 HOURS
Refills: 0 | Status: DISCONTINUED | OUTPATIENT
Start: 2022-07-21 | End: 2022-07-23

## 2022-07-21 RX ORDER — SODIUM CHLORIDE 9 MG/ML
3 INJECTION INTRAMUSCULAR; INTRAVENOUS; SUBCUTANEOUS EVERY 8 HOURS
Refills: 0 | Status: DISCONTINUED | OUTPATIENT
Start: 2022-07-21 | End: 2022-07-23

## 2022-07-21 RX ORDER — IBUPROFEN 200 MG
600 TABLET ORAL EVERY 6 HOURS
Refills: 0 | Status: COMPLETED | OUTPATIENT
Start: 2022-07-21 | End: 2023-06-19

## 2022-07-21 RX ORDER — IBUPROFEN 200 MG
600 TABLET ORAL EVERY 6 HOURS
Refills: 0 | Status: DISCONTINUED | OUTPATIENT
Start: 2022-07-21 | End: 2022-07-23

## 2022-07-21 RX ORDER — DIBUCAINE 1 %
1 OINTMENT (GRAM) RECTAL EVERY 6 HOURS
Refills: 0 | Status: DISCONTINUED | OUTPATIENT
Start: 2022-07-21 | End: 2022-07-23

## 2022-07-21 RX ORDER — PRAMOXINE HYDROCHLORIDE 150 MG/15G
1 AEROSOL, FOAM RECTAL EVERY 4 HOURS
Refills: 0 | Status: DISCONTINUED | OUTPATIENT
Start: 2022-07-21 | End: 2022-07-23

## 2022-07-21 RX ORDER — DIPHENHYDRAMINE HCL 50 MG
25 CAPSULE ORAL EVERY 6 HOURS
Refills: 0 | Status: DISCONTINUED | OUTPATIENT
Start: 2022-07-21 | End: 2022-07-23

## 2022-07-21 RX ORDER — OXYCODONE HYDROCHLORIDE 5 MG/1
5 TABLET ORAL ONCE
Refills: 0 | Status: DISCONTINUED | OUTPATIENT
Start: 2022-07-21 | End: 2022-07-23

## 2022-07-21 RX ORDER — BENZOCAINE 10 %
1 GEL (GRAM) MUCOUS MEMBRANE EVERY 6 HOURS
Refills: 0 | Status: DISCONTINUED | OUTPATIENT
Start: 2022-07-21 | End: 2022-07-23

## 2022-07-21 RX ORDER — OXYTOCIN 10 UNIT/ML
333.33 VIAL (ML) INJECTION
Qty: 20 | Refills: 0 | Status: DISCONTINUED | OUTPATIENT
Start: 2022-07-21 | End: 2022-07-23

## 2022-07-21 RX ORDER — OXYCODONE HYDROCHLORIDE 5 MG/1
5 TABLET ORAL
Refills: 0 | Status: DISCONTINUED | OUTPATIENT
Start: 2022-07-21 | End: 2022-07-23

## 2022-07-21 RX ORDER — LANOLIN
1 OINTMENT (GRAM) TOPICAL EVERY 6 HOURS
Refills: 0 | Status: DISCONTINUED | OUTPATIENT
Start: 2022-07-21 | End: 2022-07-23

## 2022-07-21 RX ORDER — AER TRAVELER 0.5 G/1
1 SOLUTION RECTAL; TOPICAL EVERY 4 HOURS
Refills: 0 | Status: DISCONTINUED | OUTPATIENT
Start: 2022-07-21 | End: 2022-07-23

## 2022-07-21 RX ORDER — ACETAMINOPHEN 500 MG
975 TABLET ORAL
Refills: 0 | Status: DISCONTINUED | OUTPATIENT
Start: 2022-07-21 | End: 2022-07-23

## 2022-07-21 RX ADMIN — Medication 975 MILLIGRAM(S): at 21:14

## 2022-07-21 RX ADMIN — Medication 600 MILLIGRAM(S): at 18:21

## 2022-07-21 RX ADMIN — Medication 1000 MILLIUNIT(S)/MIN: at 08:04

## 2022-07-21 RX ADMIN — Medication 600 MILLIGRAM(S): at 23:58

## 2022-07-21 RX ADMIN — Medication 1 TABLET(S): at 15:39

## 2022-07-21 RX ADMIN — Medication 108 GRAM(S): at 07:45

## 2022-07-21 RX ADMIN — Medication 975 MILLIGRAM(S): at 15:39

## 2022-07-21 RX ADMIN — Medication 108 GRAM(S): at 03:42

## 2022-07-21 RX ADMIN — Medication 2 MILLIUNIT(S)/MIN: at 00:12

## 2022-07-21 RX ADMIN — Medication 30 MILLIGRAM(S): at 09:01

## 2022-07-21 NOTE — OB PROVIDER DELIVERY SUMMARY - NSPROVIDERDELIVERYNOTE_OBGYN_ALL_OB_FT
at 0800 AM of a live female, and Apgars 9/9. Delivered KORINA, no nuchal cord, clear fluid. Medial episiotomy preformed. Infant's head delivered with maternal expulsive efforts. Shoulders delivered without difficulty followed by the rest of the body. Nose and mouth were bulb suctioned. Cord clamped and cut after delay. Samples obtained. Baby handed to patient. Placenta delivered spontaneously, intact, 3VC. Fundus firm, minimal bleeding. Perineum and vagina inspected. Episiotomy repaired under local anesthesia with 3-0 chromic. EBL 263cc. Hemostasis noted. Pt tolerated procedure well, in stable condition, recovering in LDR. Infant in LDR. Instrument/sponge count correct x 2 and confirmed by nurse.

## 2022-07-21 NOTE — OB PROVIDER LABOR PROGRESS NOTE - NS_SUBJECTIVE/OBJECTIVE_OBGYN_ALL_OB_FT
Patient presented in labor  FHR: baseline 120, mod variability, +accels, -decels  [place]: contractions q 2-3 minutes  Pit started @0000    Plan  - will continue pitocin
Patient evaluated and repositioned due to change of baseline to 110 bpm and periods of minimal variability. She was s/p stadol.
Patient presented in labor  FHR: baseline 120bpm, mod variability, +accels, -deccels  [place]: contractions q2-3 minutes    Plan  - will continue pitocin
Patient feels well.   Vital Signs Last 24 Hrs  T(C): 37.1 (20 Jul 2022 19:30), Max: 37.1 (20 Jul 2022 19:30)  T(F): 98.78 (20 Jul 2022 19:30), Max: 98.78 (20 Jul 2022 19:30)  HR: 56 (20 Jul 2022 19:15) (53 - 66)  BP: 96/58 (20 Jul 2022 19:15) (96/58 - 108/57)  RR: 19 (20 Jul 2022 19:30) (16 - 19)

## 2022-07-21 NOTE — OB PROVIDER DELIVERY SUMMARY - NSSELHIDDEN_OBGYN_ALL_OB_FT
[NS_DeliveryAttending1_OBGYN_ALL_OB_FT:[id number]],[NS_DeliveryAssist1_OBGYN_ALL_OB_FT:[id number]],[NS_DeliveryAssist2_OBGYN_ALL_OB_FT:[id number]=]

## 2022-07-22 DIAGNOSIS — Z32.01 ENCOUNTER FOR PREGNANCY TEST, RESULT POSITIVE: ICD-10-CM

## 2022-07-22 DIAGNOSIS — Z34.90 ENCOUNTER FOR SUPERVISION OF NORMAL PREGNANCY, UNSPECIFIED, UNSPECIFIED TRIMESTER: ICD-10-CM

## 2022-07-22 LAB
HCT VFR BLD CALC: 26.5 % — LOW (ref 34.5–45)
HGB BLD-MCNC: 8.1 G/DL — LOW (ref 11.5–15.5)

## 2022-07-22 RX ORDER — TETANUS TOXOID, REDUCED DIPHTHERIA TOXOID AND ACELLULAR PERTUSSIS VACCINE, ADSORBED 5; 2.5; 8; 8; 2.5 [IU]/.5ML; [IU]/.5ML; UG/.5ML; UG/.5ML; UG/.5ML
0.5 SUSPENSION INTRAMUSCULAR ONCE
Refills: 0 | Status: COMPLETED | OUTPATIENT
Start: 2022-07-22 | End: 2022-07-22

## 2022-07-22 RX ORDER — ACETAMINOPHEN 500 MG
2 TABLET ORAL
Qty: 32 | Refills: 0
Start: 2022-07-22 | End: 2022-07-25

## 2022-07-22 RX ORDER — IBUPROFEN 200 MG
1 TABLET ORAL
Qty: 16 | Refills: 0
Start: 2022-07-22 | End: 2022-07-25

## 2022-07-22 RX ADMIN — Medication 1 APPLICATION(S): at 08:00

## 2022-07-22 RX ADMIN — Medication 600 MILLIGRAM(S): at 13:19

## 2022-07-22 RX ADMIN — Medication 600 MILLIGRAM(S): at 06:02

## 2022-07-22 RX ADMIN — SODIUM CHLORIDE 3 MILLILITER(S): 9 INJECTION INTRAMUSCULAR; INTRAVENOUS; SUBCUTANEOUS at 15:00

## 2022-07-22 RX ADMIN — Medication 975 MILLIGRAM(S): at 03:32

## 2022-07-22 RX ADMIN — Medication 600 MILLIGRAM(S): at 18:04

## 2022-07-22 RX ADMIN — Medication 975 MILLIGRAM(S): at 15:22

## 2022-07-22 RX ADMIN — Medication 975 MILLIGRAM(S): at 08:15

## 2022-07-22 RX ADMIN — OXYCODONE HYDROCHLORIDE 5 MILLIGRAM(S): 5 TABLET ORAL at 08:15

## 2022-07-22 RX ADMIN — SODIUM CHLORIDE 3 MILLILITER(S): 9 INJECTION INTRAMUSCULAR; INTRAVENOUS; SUBCUTANEOUS at 22:26

## 2022-07-22 RX ADMIN — Medication 1 TABLET(S): at 13:19

## 2022-07-22 RX ADMIN — OXYCODONE HYDROCHLORIDE 5 MILLIGRAM(S): 5 TABLET ORAL at 09:00

## 2022-07-22 NOTE — DISCHARGE NOTE OB - PATIENT PORTAL LINK FT
You can access the FollowMyHealth Patient Portal offered by [facility] by registering at the following website: [email or web address]. By joining [employer]’s FollowMyHealth portal, you will also be able to view your health information using other applications (apps) compatible with our system.

## 2022-07-22 NOTE — DISCHARGE NOTE OB - CARE PLAN
Principal Discharge DX:	 (normal spontaneous vaginal delivery)  Assessment and plan of treatment:	1) Please take Ibuprofen as needed for pain control  2) Nothing in the vagina for 6 weeks (including no sex, no tampons, and no douching).  3) Please call your doctor for a follow up your postpartum appointment in 4-6 weeks.  4) Please continue taking vitamins postpartum. Take iron and colace for acute blood loss anemia.  5) Please call the office sooner if you have heavy vaginal bleeding, severe abdominal pain, or fever > 100.4F.  6) You may resume regular activity as tolerated   1

## 2022-07-22 NOTE — DISCHARGE NOTE OB - NS MD DC FALL RISK RISK
For information on Fall & Injury Prevention, visit: [email or web address] OR  [email or web address] OR  https://www.cdc.gov/steadi/patient.html

## 2022-07-22 NOTE — DISCHARGE NOTE OB - MEDICATION SUMMARY - MEDICATIONS TO TAKE
I will START or STAY ON the medications listed below when I get home from the hospital:    acetaminophen 500 mg oral tablet  -- 2 tab(s) by mouth every 6 hours MDD:4 tablets  -- This product contains acetaminophen.  Do not use  with any other product containing acetaminophen to prevent possible liver damage.    -- Indication: For pain    ibuprofen 600 mg oral tablet  -- 1 tab(s) by mouth every 6 hours MDD:4 tablets  -- Do not take this drug if you are pregnant.  It is very important that you take or use this exactly as directed.  Do not skip doses or discontinue unless directed by your doctor.  May cause drowsiness or dizziness.  Obtain medical advice before taking any non-prescription drugs as some may affect the action of this medication.  Take with food or milk.    -- Indication: For pain    Prenatal 19 ([place]) oral tablet  -- 1 tab(s) by mouth once a day   -- May discolor urine or feces.  Take with food or milk.    -- Indication: For maternal wellness

## 2022-07-22 NOTE — DISCHARGE NOTE OB - CARE PROVIDER_API CALL
Clinton Garza)  Obstetrics and Gynecology  [address] [place] [address] [id number]  Phone: [phone]  Fax: [phone]  Follow Up Time: 1 month

## 2022-07-23 VITALS
OXYGEN SATURATION: 99 % | HEART RATE: 62 BPM | RESPIRATION RATE: 18 BRPM | TEMPERATURE: 98 F | SYSTOLIC BLOOD PRESSURE: 112 MMHG | DIASTOLIC BLOOD PRESSURE: 73 MMHG

## 2022-07-23 RX ADMIN — Medication 975 MILLIGRAM(S): at 09:30

## 2022-07-23 RX ADMIN — Medication 975 MILLIGRAM(S): at 09:28

## 2022-07-23 RX ADMIN — Medication 1 TABLET(S): at 11:29

## 2022-07-23 RX ADMIN — Medication 600 MILLIGRAM(S): at 00:22

## 2022-07-23 RX ADMIN — Medication 600 MILLIGRAM(S): at 11:29

## 2022-07-23 RX ADMIN — Medication 600 MILLIGRAM(S): at 06:37

## 2022-07-23 NOTE — PROGRESS NOTE ADULT - SUBJECTIVE AND OBJECTIVE BOX
ARTEM LEON is a 22y  now PPD#1 s/p spontaneous vaginal delivery at 40w2d gestation with a midline episiotomy.    S:    No acute events overnight.   The patient has no complaints.  Pain controlled with current treatment regimen.   She is ambulating without difficulty and tolerating PO.   + flatus/+BM/+ voiding   She endorses appropriate lochia, which is decreasing.   She is breastfeeding without difficulty.   She denies fevers, chills, nausea and vomiting.   She denies lightheadedness, dizziness, palpitations, chest pain and SOB.     O:    T(C): 36.5 (22 @ 03:35), Max: 37.3 (22 @ 09:40)  HR: 79 (22 @ 03:35) (72 - 140)  BP: 116/78 (22 @ 03:35) (90/51 - 141/72)  RR: 18 (22 @ 03:35) (16 - 18)  SpO2: 98% (22 @ 03:35) (92% - 100%)    Gen: NAD, AOx3  CV: RRR, S1/S2 present  Pulm: CTAB  Breast: Nontender, non-engorged   Abdomen:  Soft, non-tender, non-distended, +bowel sounds  Uterus:  Fundus firm below umbilicus  VE:  Expectant lochia  Ext:  Non-tender and non-edematous                          10.7   7.54  )-----------( 189      ( 2022 13:30 )             33.7       
ARTEM LEON is a 22y  now PPD#2 s/p spontaneous vaginal delivery at 40w2d gestation with a midline episiotomy.    S:    No acute events overnight.   The patient has no complaints.  Pain controlled with current treatment regimen.   She is ambulating without difficulty and tolerating PO.   + flatus/+BM/+ voiding   She endorses appropriate lochia, which is decreasing.   She is breastfeeding without difficulty.   She denies fevers, chills, nausea and vomiting.   She denies lightheadedness, dizziness, palpitations, chest pain and SOB.     O:    ICU Vital Signs Last 24 Hrs  T(C): 36.8 (2022 15:25), Max: 36.8 (2022 15:25)  T(F): 98.2 (2022 15:25), Max: 98.2 (2022 15:25)  HR: 77 (2022 15:25) (77 - 77)  BP: 119/77 (2022 15:25) (119/77 - 119/77)  RR: 18 (2022 15:25) (18 - 18)  SpO2: 100% (2022 15:25) (100% - 100%)    Gen: NAD, AOx3, resting comfortably on room air   Abdomen:  Soft, non-tender, non-distended,   Uterus:  Fundus firm below umbilicus  VE:  Expectant lochia  Ext:  Non-tender and non-edematous                                      8.1    x     )-----------( x        ( 2022 04:50 )             26.5

## 2022-07-23 NOTE — PROGRESS NOTE ADULT - ASSESSMENT
ARTEM LEON is a 22y  now PPD#1 s/p spontaneous vaginal delivery at 40w2d gestation with a midline episiotomy.    A/P:    -Vital signs stable  -Hgb: 10.7 -> AM labs pending   -Voiding, tolerating PO, bowel function nml   -Advance care as tolerated   -Continue routine postpartum care and education  -Healthy female infant  -Dispo: Patient to be discharged when meeting all postpartum and postoperative milestones and pending attending approval.  
ARTEM LEON is a 22y  now PPD2 s/p spontaneous vaginal delivery at 40w2d gestation with a midline episiotomy.      -Vital signs stable  -Hgb: 10.7 -> 8.1, not symptomatic   -Voiding, tolerating PO, bowel function nml   -Advance care as tolerated   -Continue routine postpartum care and education  -Healthy female infant  -Dispo: Patient to be discharged pending attending approval.

## 2022-08-11 PROCEDURE — 59025 FETAL NON-STRESS TEST: CPT

## 2022-08-11 PROCEDURE — U0003: CPT

## 2022-08-11 PROCEDURE — 86780 TREPONEMA PALLIDUM: CPT

## 2022-08-11 PROCEDURE — 86901 BLOOD TYPING SEROLOGIC RH(D): CPT

## 2022-08-11 PROCEDURE — 85025 COMPLETE CBC W/AUTO DIFF WBC: CPT

## 2022-08-11 PROCEDURE — 85014 HEMATOCRIT: CPT

## 2022-08-11 PROCEDURE — 36415 COLL VENOUS BLD VENIPUNCTURE: CPT

## 2022-08-11 PROCEDURE — 87389 HIV-1 AG W/HIV-1&-2 AB AG IA: CPT

## 2022-08-11 PROCEDURE — 85018 HEMOGLOBIN: CPT

## 2022-08-11 PROCEDURE — 86850 RBC ANTIBODY SCREEN: CPT

## 2022-08-11 PROCEDURE — 86900 BLOOD TYPING SEROLOGIC ABO: CPT

## 2022-08-11 PROCEDURE — 59050 FETAL MONITOR W/REPORT: CPT

## 2022-08-11 PROCEDURE — 86703 HIV-1/HIV-2 1 RESULT ANTBDY: CPT

## 2022-08-11 PROCEDURE — U0005: CPT

## 2022-08-11 PROCEDURE — 86769 SARS-COV-2 COVID-19 ANTIBODY: CPT

## 2022-08-11 PROCEDURE — G0463: CPT

## 2022-08-19 NOTE — OB PROVIDER DELIVERY SUMMARY - BABY A: DATE/TIME OF DELIVERY
Spine appears normal, range of motion is not limited, no muscle or joint tenderness. lower right foot has multiple areas of wounds that are ulcerated but without drainage. right foot NVI. 21-Jul-2022 08:00

## 2022-09-01 PROBLEM — J45.909 UNSPECIFIED ASTHMA, UNCOMPLICATED: Chronic | Status: ACTIVE | Noted: 2022-07-20

## 2022-09-07 ENCOUNTER — APPOINTMENT (OUTPATIENT)
Dept: OBGYN | Facility: CLINIC | Age: 22
End: 2022-09-07

## 2022-09-07 VITALS
WEIGHT: 142 LBS | SYSTOLIC BLOOD PRESSURE: 112 MMHG | HEIGHT: 62 IN | BODY MASS INDEX: 26.13 KG/M2 | DIASTOLIC BLOOD PRESSURE: 58 MMHG

## 2022-09-07 PROCEDURE — 99213 OFFICE O/P EST LOW 20 MIN: CPT | Mod: TH

## 2022-09-07 NOTE — HISTORY OF PRESENT ILLNESS
[Delivery Date: ___] : on [unfilled] [Female] : Delivery History: baby girl [Wt. ___] : weighing [unfilled] [Intended Contraception] : Intended Contraception: [Currently Experiencing] : The patient is currently experiencing symptoms. [BreastFeeding Problems] : breastfeeding problems [Healing Well] : is healing well [Postpartum Follow Up] : postpartum follow up [] : delivered by vaginal delivery [Back to Normal] : is back to normal in size [Mild] : mild vaginal bleeding [Normal] : the vagina was normal [Not Done] : Examination of breasts not done [Doing Well] : is doing well [No Sign of Infection] : is showing no signs of infection [Excellent Pain Control] : has excellent pain control [None] : None [Resumed Menses] : has not resumed her menses [Resumed [place]] : has not resumed intercourse [Abdominal Pain] : no abdominal pain [Swelling] : not swollen [Hematoma] : no vaginal hematoma [Abscess Formation] : no vaginal abscess [Infected] : did not appear infected [Dehiscence] : was not dehisced [FreeTextEntry8] : Patient doing well breast-feeding without complication no evidence of depression [de-identified] : VAGINAL DISCHARGE normal [de-identified] : None [de-identified] : Within normal limits uterus normal size [de-identified] : Normal postpartum exam no evidence of depression patient desires birth control [de-identified] : We will start [place] birth control risk-benefit pros cons discussed in layman's terms questions answered return in 3 months [Complications:___] : no complications [Breastfeeding] : not currently nursing [S/Sx PP Depression] : no signs/symptoms of postpartum depression [Erythema] : not erythematous [Cervix Sample Taken] : cervical sample not taken for a Pap smear

## 2023-06-06 RX ORDER — NORETHINDRONE 0.35 MG/1
0.35 TABLET ORAL DAILY
Qty: 1 | Refills: 5 | Status: DISCONTINUED | COMMUNITY
Start: 2022-09-07 | End: 2023-06-06

## 2023-06-07 ENCOUNTER — APPOINTMENT (OUTPATIENT)
Dept: OBGYN | Facility: CLINIC | Age: 23
End: 2023-06-07
Payer: MEDICAID

## 2023-06-07 ENCOUNTER — ASOB RESULT (OUTPATIENT)
Age: 23
End: 2023-06-07

## 2023-06-07 VITALS — BODY MASS INDEX: 26.5 KG/M2 | WEIGHT: 144 LBS | HEIGHT: 62 IN

## 2023-06-07 DIAGNOSIS — N92.6 IRREGULAR MENSTRUATION, UNSPECIFIED: ICD-10-CM

## 2023-06-07 PROCEDURE — 99214 OFFICE O/P EST MOD 30 MIN: CPT | Mod: TH

## 2023-06-07 PROCEDURE — 76830 TRANSVAGINAL US NON-OB: CPT

## 2023-06-07 NOTE — PLAN
[FreeTextEntry1] : Teen blood work referral given, return in 4 weeks for ACOG exam sequential 120 and Ultra-Screen scheduled at maternal-fetal medicine.

## 2023-06-07 NOTE — HISTORY OF PRESENT ILLNESS
[HIV test declined] : HIV test declined [Syphilis test declined] : Syphilis test declined [Gonorrhea test declined] : Gonorrhea test declined [Chlamydia test declined] : Chlamydia test declined [Trichomonas test declined] : Trichomonas test declined [HPV test declined] : HPV test declined [Hepatitis B test declined] : Hepatitis B test declined [Hepatitis C test declined] : Hepatitis C test declined [N] : Patient does not use contraception [Y] : Positive pregnancy history [Currently Active] : currently active [Men] : men [Vaginal] : vaginal [No] : No [TextBox_4] : PREGNANCY CONFIRMATION\par LMP:4/17/2023\par GA:7W2D\par SYLVIE:1/21/2024\par SYLVIE: [LMPDate] : 4/17/23 [PGHxTotal] : 3 [[facility] CenterxF[facility]] : 1 [PGHxAbortions] : 1 [[facility] Healthcareiving] : 1 [TextBox_6] : 4/17/23 [FreeTextEntry1] : 4/17/23

## 2023-06-10 ENCOUNTER — LABORATORY RESULT (OUTPATIENT)
Age: 23
End: 2023-06-10

## 2023-06-12 LAB
ABO + RH PNL BLD: NORMAL
ALBUMIN SERPL ELPH-MCNC: 4.5 G/DL
ALP BLD-CCNC: 77 U/L
ALT SERPL-CCNC: 10 U/L
ANION GAP SERPL CALC-SCNC: 11 MMOL/L
APPEARANCE: ABNORMAL
AST SERPL-CCNC: 15 U/L
BILIRUB SERPL-MCNC: 1 MG/DL
BILIRUBIN URINE: NEGATIVE
BLD GP AB SCN SERPL QL: NORMAL
BLOOD URINE: NEGATIVE
BUN SERPL-MCNC: 9 MG/DL
CALCIUM SERPL-MCNC: 9.3 MG/DL
CHLORIDE SERPL-SCNC: 106 MMOL/L
CMV IGG SERPL QL: 3.8 U/ML
CMV IGG SERPL-IMP: POSITIVE
CMV IGM SERPL QL: <8 AU/ML
CMV IGM SERPL QL: NEGATIVE
CO2 SERPL-SCNC: 22 MMOL/L
COLOR: YELLOW
CREAT SERPL-MCNC: 0.62 MG/DL
EGFR: 130 ML/MIN/1.73M2
ESTIMATED AVERAGE GLUCOSE: 100 MG/DL
GLUCOSE QUALITATIVE U: NEGATIVE MG/DL
GLUCOSE SERPL-MCNC: 94 MG/DL
HBA1C MFR BLD HPLC: 5.1 %
HBV SURFACE AG SER QL: NONREACTIVE
HCV AB SER QL: NONREACTIVE
HCV S/CO RATIO: 0.1 S/CO
HGB A MFR BLD: 97.4 %
HGB A2 MFR BLD: 2.6 %
HGB FRACT BLD-IMP: NORMAL
HIV1+2 AB SPEC QL IA.RAPID: NONREACTIVE
KETONES URINE: NEGATIVE MG/DL
LEUKOCYTE ESTERASE URINE: ABNORMAL
MEV IGG FLD QL IA: 93.5 AU/ML
MEV IGG+IGM SER-IMP: POSITIVE
MUV AB SER-ACNC: NEGATIVE
MUV IGG SER QL IA: <5 AU/ML
NITRITE URINE: NEGATIVE
PH URINE: 7
POTASSIUM SERPL-SCNC: 4.1 MMOL/L
PROT SERPL-MCNC: 6.6 G/DL
PROTEIN URINE: 30 MG/DL
RUBV IGG FLD-ACNC: 4.5 INDEX
RUBV IGG SER-IMP: POSITIVE
SODIUM SERPL-SCNC: 139 MMOL/L
SPECIFIC GRAVITY URINE: 1.03
T GONDII AB SER-IMP: NEGATIVE
T GONDII AB SER-IMP: NEGATIVE
T GONDII IGG SER QL: 4.2 IU/ML
T GONDII IGM SER QL: <3 AU/ML
T PALLIDUM AB SER QL IA: NEGATIVE
TSH SERPL-ACNC: 1.26 UIU/ML
UROBILINOGEN URINE: 0.2 MG/DL
VZV AB TITR SER: POSITIVE
VZV IGG SER IF-ACNC: 764.7 INDEX

## 2023-06-13 NOTE — OB PROVIDER H&P - NS_PREOPBLOODCONS_OBGYN_ALL_OB
Photo Preface (Leave Blank If You Do Not Want): Photographs were obtained today Details (Free Text): Measurements: 3.5 x 2.5 Detail Level: Zone No

## 2023-06-14 LAB
AMPHET UR-MCNC: NEGATIVE NG/ML
B19V IGG SER QL IA: 4.06 INDEX
B19V IGG+IGM SER-IMP: NORMAL
B19V IGG+IGM SER-IMP: POSITIVE
B19V IGM FLD-ACNC: 0.21 INDEX
B19V IGM SER-ACNC: NEGATIVE
BARBITURATES UR-MCNC: NEGATIVE NG/ML
BENZODIAZ UR-MCNC: NEGATIVE NG/ML
COCAINE METAB.OTHER UR-MCNC: NEGATIVE NG/ML
CREATININE, URINE: 18.7 MG/DL
FENTANYL, URINE: NEGATIVE NG/ML
LEAD BLD-MCNC: <1 UG/DL
METHADONE UR-MCNC: NEGATIVE NG/ML
OPIATES UR-MCNC: NEGATIVE NG/ML
OXYCODONE/OXYMORPHONE, URINE: NEGATIVE NG/ML
PCP UR-MCNC: NEGATIVE NG/ML
PH, URINE: 6.5
PLEASE NOTE: DRUGSCRUR: NORMAL
THC UR QL: NEGATIVE NG/ML

## 2023-07-10 ENCOUNTER — NON-APPOINTMENT (OUTPATIENT)
Age: 23
End: 2023-07-10

## 2023-07-11 ENCOUNTER — NON-APPOINTMENT (OUTPATIENT)
Age: 23
End: 2023-07-11

## 2023-07-11 ENCOUNTER — APPOINTMENT (OUTPATIENT)
Dept: OBGYN | Facility: CLINIC | Age: 23
End: 2023-07-11
Payer: MEDICAID

## 2023-07-11 VITALS
OXYGEN SATURATION: 100 % | SYSTOLIC BLOOD PRESSURE: 102 MMHG | WEIGHT: 140.5 LBS | DIASTOLIC BLOOD PRESSURE: 62 MMHG | HEIGHT: 62 IN | BODY MASS INDEX: 25.86 KG/M2 | HEART RATE: 71 BPM

## 2023-07-11 DIAGNOSIS — Z11.3 ENCOUNTER FOR SCREENING FOR INFECTIONS WITH A PREDOMINANTLY SEXUAL MODE OF TRANSMISSION: ICD-10-CM

## 2023-07-11 DIAGNOSIS — Z11.51 ENCOUNTER FOR SCREENING FOR HUMAN PAPILLOMAVIRUS (HPV): ICD-10-CM

## 2023-07-11 DIAGNOSIS — Z01.419 ENCOUNTER FOR GYNECOLOGICAL EXAMINATION (GENERAL) (ROUTINE) W/OUT ABNORMAL FINDINGS: ICD-10-CM

## 2023-07-11 PROCEDURE — 99213 OFFICE O/P EST LOW 20 MIN: CPT | Mod: TH

## 2023-07-12 LAB
C TRACH RRNA SPEC QL NAA+PROBE: NOT DETECTED
HPV HIGH+LOW RISK DNA PNL CVX: NOT DETECTED
N GONORRHOEA RRNA SPEC QL NAA+PROBE: NOT DETECTED
SOURCE TP AMPLIFICATION: NORMAL

## 2023-07-17 ENCOUNTER — APPOINTMENT (OUTPATIENT)
Dept: ANTEPARTUM | Facility: CLINIC | Age: 23
End: 2023-07-17
Payer: MEDICAID

## 2023-07-17 ENCOUNTER — ASOB RESULT (OUTPATIENT)
Age: 23
End: 2023-07-17

## 2023-07-17 LAB — CYTOLOGY CVX/VAG DOC THIN PREP: NORMAL

## 2023-07-17 PROCEDURE — 76813 OB US NUCHAL MEAS 1 GEST: CPT

## 2023-07-19 ENCOUNTER — ASOB RESULT (OUTPATIENT)
Age: 23
End: 2023-07-19

## 2023-07-19 ENCOUNTER — APPOINTMENT (OUTPATIENT)
Dept: MATERNAL FETAL MEDICINE | Facility: CLINIC | Age: 23
End: 2023-07-19
Payer: MEDICAID

## 2023-07-19 PROCEDURE — 99442: CPT

## 2023-07-20 ENCOUNTER — APPOINTMENT (OUTPATIENT)
Dept: ANTEPARTUM | Facility: CLINIC | Age: 23
End: 2023-07-20
Payer: MEDICAID

## 2023-07-20 LAB
ADDITIONAL US: NORMAL
COMMENTS: AFP: NORMAL
CRL SCAN TWIN B: NORMAL
CRL SCAN: NORMAL
CROWN RUMP LENGTH TWIN B: NORMAL
CROWN RUMP LENGTH: 68.4 MM
DOWN SYNDROME AGE RISK: NORMAL
DOWN SYNDROME INTERPRETATION: NORMAL
DOWN SYNDROME SCREENING RISK: NORMAL
GEST. AGE ON COLLECTION DATE: 12.9 WEEKS
HCG MOM: 0.88
HCG VALUE: 82.9 IU/ML
MATERNAL AGE AT EDD: 21.9 YR
NOTE: AFP: NORMAL
NT MOM TWIN B: NORMAL
NT TWIN B: NORMAL
NUCHAL TRANSLUCENCY (NT): 2.6 MM
NUCHAL TRANSLUCENCY MOM: 1.44
NUMBER OF FETUSES: 1
PAPP-A MOM: 1.02
PAPP-A VALUE: 1240.3 NG/ML
RACE: NORMAL
RESULTS AFP: NORMAL
SONOGRAPHER ID#: NORMAL
SUBMIT PART 2 SAMPLE USING: NORMAL
TEST RESULTS: AFP: NORMAL
TRISOMY 18 AGE RISK: NORMAL
TRISOMY 18 INTERPRETATION: NORMAL
TRISOMY 18 SCREENING RISK: NORMAL
WEIGHT AFP: 144 LBS

## 2023-07-20 PROCEDURE — 36415 COLL VENOUS BLD VENIPUNCTURE: CPT

## 2023-07-25 ENCOUNTER — NON-APPOINTMENT (OUTPATIENT)
Age: 23
End: 2023-07-25

## 2023-08-07 ENCOUNTER — NON-APPOINTMENT (OUTPATIENT)
Age: 23
End: 2023-08-07

## 2023-08-08 ENCOUNTER — NON-APPOINTMENT (OUTPATIENT)
Age: 23
End: 2023-08-08

## 2023-08-08 ENCOUNTER — APPOINTMENT (OUTPATIENT)
Dept: OBGYN | Facility: CLINIC | Age: 23
End: 2023-08-08
Payer: MEDICAID

## 2023-08-08 PROCEDURE — 99213 OFFICE O/P EST LOW 20 MIN: CPT | Mod: TH

## 2023-08-21 LAB
AFP MOM: 0.73
AFP VALUE: 26.5 NG/ML
ALPHA FETOPROTEIN SERUM COMMENT: NORMAL
ALPHA FETOPROTEIN SERUM INTERPRETATION: NORMAL
ALPHA FETOPROTEIN SERUM RESULTS: NORMAL
ALPHA FETOPROTEIN SERUM TEST RESULTS: NORMAL
GESTATIONAL AGE BASED ON: NORMAL
GESTATIONAL AGE ON COLLECTION DATE: 16.1 WEEKS
INSULIN DEP DIABETES: NO
MATERNAL AGE AT EDD AFP: 21.9 YR
MULTIPLE GESTATION: NO
OSBR RISK 1 IN: NORMAL
RACE: NORMAL
WEIGHT AFP: 143 LBS

## 2023-08-22 ENCOUNTER — NON-APPOINTMENT (OUTPATIENT)
Age: 23
End: 2023-08-22

## 2023-08-22 ENCOUNTER — APPOINTMENT (OUTPATIENT)
Dept: OBGYN | Facility: CLINIC | Age: 23
End: 2023-08-22
Payer: MEDICAID

## 2023-08-22 VITALS
WEIGHT: 145 LBS | BODY MASS INDEX: 26.68 KG/M2 | HEIGHT: 62 IN | SYSTOLIC BLOOD PRESSURE: 98 MMHG | DIASTOLIC BLOOD PRESSURE: 61 MMHG

## 2023-08-22 DIAGNOSIS — N89.8 OTHER SPECIFIED NONINFLAMMATORY DISORDERS OF VAGINA: ICD-10-CM

## 2023-08-22 DIAGNOSIS — A63.0 ANOGENITAL (VENEREAL) WARTS: ICD-10-CM

## 2023-08-22 PROCEDURE — 11102 TANGNTL BX SKIN SINGLE LES: CPT

## 2023-08-28 ENCOUNTER — NON-APPOINTMENT (OUTPATIENT)
Age: 23
End: 2023-08-28

## 2023-08-28 LAB
A VAGINAE DNA VAG QL NAA+PROBE: NORMAL
BVAB2 DNA VAG QL NAA+PROBE: NORMAL
C KRUSEI DNA VAG QL NAA+PROBE: NEGATIVE
C KRUSEI DNA VAG QL NAA+PROBE: POSITIVE
C TRACH RRNA SPEC QL NAA+PROBE: NEGATIVE
CANDIDA DNA VAG QL NAA+PROBE: NEGATIVE
MEGA1 DNA VAG QL NAA+PROBE: NORMAL
N GONORRHOEA RRNA SPEC QL NAA+PROBE: NEGATIVE
T VAGINALIS RRNA SPEC QL NAA+PROBE: NEGATIVE

## 2023-09-06 ENCOUNTER — NON-APPOINTMENT (OUTPATIENT)
Age: 23
End: 2023-09-06

## 2023-09-08 ENCOUNTER — NON-APPOINTMENT (OUTPATIENT)
Age: 23
End: 2023-09-08

## 2023-09-08 ENCOUNTER — APPOINTMENT (OUTPATIENT)
Dept: OBGYN | Facility: CLINIC | Age: 23
End: 2023-09-08
Payer: MEDICAID

## 2023-09-08 VITALS
WEIGHT: 148 LBS | HEIGHT: 62 IN | BODY MASS INDEX: 27.23 KG/M2 | DIASTOLIC BLOOD PRESSURE: 66 MMHG | SYSTOLIC BLOOD PRESSURE: 103 MMHG

## 2023-09-08 DIAGNOSIS — R82.998 OTHER ABNORMAL FINDINGS IN URINE: ICD-10-CM

## 2023-09-08 LAB
CLARITY UR: CLEAR
COLLECTION METHOD: NORMAL
GLUCOSE UR-MCNC: NEGATIVE
LEUKOCYTE ESTERASE UR QL STRIP: NEGATIVE
NITRITE UR QL STRIP: NEGATIVE
PROT UR STRIP-MCNC: NEGATIVE

## 2023-09-08 PROCEDURE — 99213 OFFICE O/P EST LOW 20 MIN: CPT | Mod: TH,25

## 2023-09-12 LAB — BACTERIA UR CULT: NORMAL

## 2023-09-13 ENCOUNTER — ASOB RESULT (OUTPATIENT)
Age: 23
End: 2023-09-13

## 2023-09-13 ENCOUNTER — NON-APPOINTMENT (OUTPATIENT)
Age: 23
End: 2023-09-13

## 2023-09-13 ENCOUNTER — APPOINTMENT (OUTPATIENT)
Dept: ANTEPARTUM | Facility: CLINIC | Age: 23
End: 2023-09-13
Payer: MEDICAID

## 2023-09-13 PROCEDURE — 76817 TRANSVAGINAL US OBSTETRIC: CPT

## 2023-09-13 PROCEDURE — 76805 OB US >/= 14 WKS SNGL FETUS: CPT

## 2023-10-05 ENCOUNTER — NON-APPOINTMENT (OUTPATIENT)
Age: 23
End: 2023-10-05

## 2023-10-06 ENCOUNTER — APPOINTMENT (OUTPATIENT)
Dept: OBGYN | Facility: CLINIC | Age: 23
End: 2023-10-06
Payer: MEDICAID

## 2023-10-06 VITALS
BODY MASS INDEX: 27.97 KG/M2 | HEART RATE: 85 BPM | HEIGHT: 62 IN | WEIGHT: 152 LBS | SYSTOLIC BLOOD PRESSURE: 111 MMHG | DIASTOLIC BLOOD PRESSURE: 68 MMHG

## 2023-10-06 LAB
CLARITY UR: CLEAR
COLLECTION METHOD: NORMAL
GLUCOSE UR-MCNC: NORMAL
LEUKOCYTE ESTERASE UR QL STRIP: NORMAL
NITRITE UR QL STRIP: NORMAL
PROT UR STRIP-MCNC: NORMAL

## 2023-10-06 PROCEDURE — 99213 OFFICE O/P EST LOW 20 MIN: CPT | Mod: TH

## 2023-10-26 ENCOUNTER — NON-APPOINTMENT (OUTPATIENT)
Age: 23
End: 2023-10-26

## 2023-11-03 ENCOUNTER — APPOINTMENT (OUTPATIENT)
Dept: OBGYN | Facility: CLINIC | Age: 23
End: 2023-11-03
Payer: MEDICAID

## 2023-11-03 VITALS
HEART RATE: 92 BPM | BODY MASS INDEX: 27.97 KG/M2 | HEIGHT: 62 IN | SYSTOLIC BLOOD PRESSURE: 100 MMHG | WEIGHT: 152 LBS | DIASTOLIC BLOOD PRESSURE: 53 MMHG

## 2023-11-03 DIAGNOSIS — R82.998 OTHER ABNORMAL FINDINGS IN URINE: ICD-10-CM

## 2023-11-03 PROCEDURE — 99213 OFFICE O/P EST LOW 20 MIN: CPT | Mod: TH

## 2023-11-03 RX ORDER — TERCONAZOLE 8 MG/G
0.8 CREAM VAGINAL
Qty: 1 | Refills: 0 | Status: ACTIVE | COMMUNITY
Start: 2023-08-22 | End: 1900-01-01

## 2023-11-06 LAB — GLUCOSE 1H P 50 G GLC PO SERPL-MCNC: 84 MG/DL

## 2023-11-07 LAB
BASOPHILS # BLD AUTO: 0.04 K/UL
BASOPHILS NFR BLD AUTO: 0.4 %
EOSINOPHIL # BLD AUTO: 0.37 K/UL
EOSINOPHIL NFR BLD AUTO: 3.4 %
HCT VFR BLD CALC: 33.5 %
HGB BLD-MCNC: 10.8 G/DL
IMM GRANULOCYTES NFR BLD AUTO: 1.1 %
LYMPHOCYTES # BLD AUTO: 1.71 K/UL
LYMPHOCYTES NFR BLD AUTO: 15.5 %
MAN DIFF?: NORMAL
MCHC RBC-ENTMCNC: 28.3 PG
MCHC RBC-ENTMCNC: 32.2 GM/DL
MCV RBC AUTO: 87.9 FL
MONOCYTES # BLD AUTO: 0.88 K/UL
MONOCYTES NFR BLD AUTO: 8 %
NEUTROPHILS # BLD AUTO: 7.89 K/UL
NEUTROPHILS NFR BLD AUTO: 71.6 %
PLATELET # BLD AUTO: 247 K/UL
RBC # BLD: 3.81 M/UL
RBC # FLD: 13.8 %
WBC # FLD AUTO: 11.01 K/UL

## 2023-11-22 ENCOUNTER — NON-APPOINTMENT (OUTPATIENT)
Age: 23
End: 2023-11-22

## 2023-11-27 ENCOUNTER — APPOINTMENT (OUTPATIENT)
Dept: OBGYN | Facility: CLINIC | Age: 23
End: 2023-11-27
Payer: MEDICAID

## 2023-11-27 VITALS
WEIGHT: 160 LBS | SYSTOLIC BLOOD PRESSURE: 90 MMHG | DIASTOLIC BLOOD PRESSURE: 59 MMHG | HEIGHT: 62 IN | BODY MASS INDEX: 29.44 KG/M2

## 2023-11-27 PROCEDURE — 99213 OFFICE O/P EST LOW 20 MIN: CPT | Mod: TH

## 2023-11-27 RX ORDER — TERCONAZOLE 8 MG/G
0.8 CREAM VAGINAL
Qty: 1 | Refills: 0 | Status: ACTIVE | COMMUNITY
Start: 2023-11-27 | End: 1900-01-01

## 2023-12-04 ENCOUNTER — NON-APPOINTMENT (OUTPATIENT)
Age: 23
End: 2023-12-04

## 2023-12-06 ENCOUNTER — ASOB RESULT (OUTPATIENT)
Age: 23
End: 2023-12-06

## 2023-12-06 ENCOUNTER — APPOINTMENT (OUTPATIENT)
Dept: ANTEPARTUM | Facility: CLINIC | Age: 23
End: 2023-12-06
Payer: MEDICAID

## 2023-12-06 PROCEDURE — 76816 OB US FOLLOW-UP PER FETUS: CPT

## 2023-12-07 ENCOUNTER — NON-APPOINTMENT (OUTPATIENT)
Age: 23
End: 2023-12-07

## 2023-12-11 ENCOUNTER — APPOINTMENT (OUTPATIENT)
Dept: OBGYN | Facility: CLINIC | Age: 23
End: 2023-12-11
Payer: MEDICAID

## 2023-12-11 VITALS
HEART RATE: 92 BPM | BODY MASS INDEX: 29.44 KG/M2 | WEIGHT: 160 LBS | HEIGHT: 62 IN | DIASTOLIC BLOOD PRESSURE: 55 MMHG | SYSTOLIC BLOOD PRESSURE: 103 MMHG

## 2023-12-11 LAB
CLARITY UR: CLEAR
COLLECTION METHOD: NORMAL
GLUCOSE UR-MCNC: NEGATIVE
LEUKOCYTE ESTERASE UR QL STRIP: NORMAL
NITRITE UR QL STRIP: NEGATIVE
PROT UR STRIP-MCNC: NEGATIVE

## 2023-12-11 PROCEDURE — 99213 OFFICE O/P EST LOW 20 MIN: CPT | Mod: TH

## 2023-12-13 LAB — BACTERIA UR CULT: NORMAL

## 2023-12-21 ENCOUNTER — NON-APPOINTMENT (OUTPATIENT)
Age: 23
End: 2023-12-21

## 2023-12-29 ENCOUNTER — LABORATORY RESULT (OUTPATIENT)
Age: 23
End: 2023-12-29

## 2023-12-29 ENCOUNTER — APPOINTMENT (OUTPATIENT)
Dept: OBGYN | Facility: CLINIC | Age: 23
End: 2023-12-29
Payer: MEDICAID

## 2023-12-29 PROCEDURE — 99213 OFFICE O/P EST LOW 20 MIN: CPT | Mod: TH

## 2024-01-02 LAB
BACTERIA UR CULT: NORMAL
GP B STREP DNA SPEC QL NAA+PROBE: DETECTED
HIV1+2 AB SPEC QL IA.RAPID: NONREACTIVE
SOURCE GBS: NORMAL

## 2024-01-04 ENCOUNTER — NON-APPOINTMENT (OUTPATIENT)
Age: 24
End: 2024-01-04

## 2024-01-04 ENCOUNTER — ASOB RESULT (OUTPATIENT)
Age: 24
End: 2024-01-04

## 2024-01-04 ENCOUNTER — APPOINTMENT (OUTPATIENT)
Dept: ANTEPARTUM | Facility: CLINIC | Age: 24
End: 2024-01-04
Payer: MEDICAID

## 2024-01-04 PROCEDURE — 76819 FETAL BIOPHYS PROFIL W/O NST: CPT

## 2024-01-04 PROCEDURE — 76816 OB US FOLLOW-UP PER FETUS: CPT

## 2024-01-08 ENCOUNTER — NON-APPOINTMENT (OUTPATIENT)
Age: 24
End: 2024-01-08

## 2024-01-08 ENCOUNTER — APPOINTMENT (OUTPATIENT)
Dept: OBGYN | Facility: CLINIC | Age: 24
End: 2024-01-08
Payer: MEDICAID

## 2024-01-08 VITALS
DIASTOLIC BLOOD PRESSURE: 64 MMHG | WEIGHT: 170 LBS | BODY MASS INDEX: 31.28 KG/M2 | SYSTOLIC BLOOD PRESSURE: 100 MMHG | HEIGHT: 62 IN

## 2024-01-08 PROCEDURE — 99213 OFFICE O/P EST LOW 20 MIN: CPT | Mod: TH

## 2024-01-11 ENCOUNTER — NON-APPOINTMENT (OUTPATIENT)
Age: 24
End: 2024-01-11

## 2024-01-12 LAB — BACTERIA UR CULT: NORMAL

## 2024-01-13 ENCOUNTER — OUTPATIENT (OUTPATIENT)
Dept: OUTPATIENT SERVICES | Facility: HOSPITAL | Age: 24
LOS: 1 days | End: 2024-01-13
Payer: COMMERCIAL

## 2024-01-13 VITALS
SYSTOLIC BLOOD PRESSURE: 127 MMHG | DIASTOLIC BLOOD PRESSURE: 61 MMHG | TEMPERATURE: 99 F | RESPIRATION RATE: 16 BRPM | HEART RATE: 81 BPM

## 2024-01-13 DIAGNOSIS — O26.899 OTHER SPECIFIED PREGNANCY RELATED CONDITIONS, UNSPECIFIED TRIMESTER: ICD-10-CM

## 2024-01-13 PROCEDURE — 59025 FETAL NON-STRESS TEST: CPT

## 2024-01-13 PROCEDURE — G0463: CPT

## 2024-01-13 NOTE — OB RN TRIAGE NOTE - FALL HARM RISK - UNIVERSAL INTERVENTIONS
Bed in lowest position, wheels locked, appropriate side rails in place/Call bell, personal items and telephone in reach/Instruct patient to call for assistance before getting out of bed or chair/Non-slip footwear when patient is out of bed/[place] to call system/Physically safe environment - no spills, clutter or unnecessary equipment/Purposeful Proactive Rounding/Room/bathroom lighting operational, light cord in reach Bed in lowest position, wheels locked, appropriate side rails in place/Call bell, personal items and telephone in reach/Instruct patient to call for assistance before getting out of bed or chair/Non-slip footwear when patient is out of bed/[place] to call system/Physically safe environment - no spills, clutter or unnecessary equipment/Purposeful Proactive Rounding/Room/bathroom lighting operational, light cord in reach

## 2024-01-14 VITALS — DIASTOLIC BLOOD PRESSURE: 54 MMHG | SYSTOLIC BLOOD PRESSURE: 109 MMHG | HEART RATE: 75 BPM | TEMPERATURE: 98 F

## 2024-01-14 NOTE — OB PROVIDER TRIAGE NOTE - NSHPPHYSICALEXAM_GEN_ALL_CORE
T(C): 37 (01-13-24 @ 23:13), Max: 37 (01-13-24 @ 23:13)  HR: 81 (01-13-24 @ 23:20) (81 - 81)  BP: 127/61 (01-13-24 @ 23:20) (127/61 - 127/61)  RR: 16 (01-13-24 @ 23:13) (16 - 16)    Gen: NAD, well-appearing  Heart: S1 S2, RRR  Lungs: CTAB  Abd: soft, gravid  Ext: non-edematous, non-tender   SVE: external os noted to be 2cm on exam. Internal os closed. 0/50/-3  FHT: 145bpm, +accels no decels mod gilma  [place]: q10-20 mins T(C): 37 (01-13-24 @ 23:13), Max: 37 (01-13-24 @ 23:13)  HR: 81 (01-13-24 @ 23:20) (81 - 81)  BP: 127/61 (01-13-24 @ 23:20) (127/61 - 127/61)  RR: 16 (01-13-24 @ 23:13) (16 - 16)    Gen: NAD, well-appearing  Heart: S1 S2, RRR  Lungs: CTAB  Abd: soft, gravid  Ext: non-edematous, non-tender   SVE: external os noted to be 2cm on exam. Internal os closed. 0/50/-3  FHT: 145bpm, +accels no decels mod gilma  [place]: q10-20 mins

## 2024-01-14 NOTE — OB PROVIDER TRIAGE NOTE - HISTORY OF PRESENT ILLNESS
ARTEM LEON is a 23y  at 39 weeks GA who presents to L&D for contractions. She reports the contraction-like pain started earlier tonight. Since getting to triage, the contractions have spaced out, she reports them being every 10-20 minutes. Pt denies vaginal bleeding or leakage of fluid. She endorses good fetal movement.     Pt denies trauma.   Pt denies headaches, visual disturbances, RUQ pain, epigastric pain and new-onset edema.   She denies any urinary complaints.   She denies fevers, chills, nausea, vomiting.   She denies shortness of breath, chest pain, and palpitations.    Pregnancy course is uncomplicated.     POB:  - x1  -TOP x1  PGYN: -fibroids/-cysts, denied STD hx, denies abnormal PAPs  PMH: mild asthma  PSH: denies  SH: Denies tobacco use, EtOH use and illicit drug use during the pregnancy; Feels safe at home  Meds: PNV, albuterol (infrequently)  All: NKDA

## 2024-01-14 NOTE — OB PROVIDER TRIAGE NOTE - ATTENDING COMMENTS
Addendum:    Subjective Hx, Physical Exam, Laboratory & Imaging results reviewed.  I agree with the Resident Physician's assessment and plan of care, as discussed above.  NST reactive. Call, follow up, and return to Hospital parameters reviewed at length.  She was given the opportunity to ask questions and all were addressed. Discharge home.    Elpidio Doan MD (OB Hospitalist On-Call)

## 2024-01-14 NOTE — OB PROVIDER TRIAGE NOTE - NSOBPROVIDERNOTE_OBGYN_ALL_OB_FT
A/P: Pt is a 24 yo  at 39w GA who presents to triage for rule-out labor.  -FHT reactive, reassuring  -Closed on exam  -Irregularly lisandra, have spaced out since getting to triage  -Pt has appt with Dr. Garza this coming Tuesday.    Dispo: Home. F/u outpatient. Strict labor return precautions given.     Discussed with dr. mobley A/P: Pt is a 22 yo  at 39w GA who presents to triage for rule-out labor.  -FHT reactive, reassuring  -Closed on exam  -Irregularly lisandra, have spaced out since getting to triage  -Pt has appt with Dr. Garza this coming Tuesday.    Dispo: Home. F/u outpatient. Strict labor return precautions given.     Discussed with dr. mobley

## 2024-01-15 DIAGNOSIS — J45.909 UNSPECIFIED ASTHMA, UNCOMPLICATED: ICD-10-CM

## 2024-01-15 DIAGNOSIS — Z3A.38 38 WEEKS GESTATION OF PREGNANCY: ICD-10-CM

## 2024-01-15 DIAGNOSIS — O99.513 DISEASES OF THE RESPIRATORY SYSTEM COMPLICATING PREGNANCY, THIRD TRIMESTER: ICD-10-CM

## 2024-01-15 DIAGNOSIS — O47.1 FALSE LABOR AT OR AFTER 37 COMPLETED WEEKS OF GESTATION: ICD-10-CM

## 2024-01-16 ENCOUNTER — NON-APPOINTMENT (OUTPATIENT)
Age: 24
End: 2024-01-16

## 2024-01-16 ENCOUNTER — APPOINTMENT (OUTPATIENT)
Dept: OBGYN | Facility: CLINIC | Age: 24
End: 2024-01-16
Payer: MEDICAID

## 2024-01-16 VITALS
WEIGHT: 173 LBS | BODY MASS INDEX: 31.83 KG/M2 | SYSTOLIC BLOOD PRESSURE: 111 MMHG | HEIGHT: 62 IN | DIASTOLIC BLOOD PRESSURE: 71 MMHG

## 2024-01-16 LAB
CLARITY UR: CLEAR
COLLECTION METHOD: NORMAL
GLUCOSE UR-MCNC: NEGATIVE
LEUKOCYTE ESTERASE UR QL STRIP: NORMAL
NITRITE UR QL STRIP: NEGATIVE
PROT UR STRIP-MCNC: 30

## 2024-01-16 PROCEDURE — 99213 OFFICE O/P EST LOW 20 MIN: CPT | Mod: TH

## 2024-01-19 LAB — BACTERIA UR CULT: NORMAL

## 2024-01-22 ENCOUNTER — NON-APPOINTMENT (OUTPATIENT)
Age: 24
End: 2024-01-22

## 2024-01-22 ENCOUNTER — APPOINTMENT (OUTPATIENT)
Dept: OBGYN | Facility: CLINIC | Age: 24
End: 2024-01-22
Payer: MEDICAID

## 2024-01-22 VITALS
HEIGHT: 62 IN | SYSTOLIC BLOOD PRESSURE: 114 MMHG | DIASTOLIC BLOOD PRESSURE: 69 MMHG | WEIGHT: 173 LBS | BODY MASS INDEX: 31.83 KG/M2

## 2024-01-22 PROCEDURE — 59025 FETAL NON-STRESS TEST: CPT

## 2024-01-22 RX ORDER — TERCONAZOLE 8 MG/G
0.8 CREAM VAGINAL
Qty: 1 | Refills: 0 | Status: ACTIVE | COMMUNITY
Start: 2024-01-22 | End: 1900-01-01

## 2024-01-26 ENCOUNTER — APPOINTMENT (OUTPATIENT)
Dept: OBGYN | Facility: CLINIC | Age: 24
End: 2024-01-26
Payer: MEDICAID

## 2024-01-26 ENCOUNTER — ASOB RESULT (OUTPATIENT)
Age: 24
End: 2024-01-26

## 2024-01-26 VITALS
HEIGHT: 62 IN | WEIGHT: 174 LBS | DIASTOLIC BLOOD PRESSURE: 69 MMHG | SYSTOLIC BLOOD PRESSURE: 111 MMHG | BODY MASS INDEX: 32.02 KG/M2

## 2024-01-26 LAB
A VAGINAE DNA VAG QL NAA+PROBE: ABNORMAL
BVAB2 DNA VAG QL NAA+PROBE: NORMAL
C KRUSEI DNA VAG QL NAA+PROBE: NEGATIVE
C KRUSEI DNA VAG QL NAA+PROBE: POSITIVE
C TRACH RRNA SPEC QL NAA+PROBE: NEGATIVE
CANDIDA DNA VAG QL NAA+PROBE: NEGATIVE
MEGA1 DNA VAG QL NAA+PROBE: NORMAL
N GONORRHOEA RRNA SPEC QL NAA+PROBE: NEGATIVE
T VAGINALIS RRNA SPEC QL NAA+PROBE: NEGATIVE

## 2024-01-26 PROCEDURE — 76816 OB US FOLLOW-UP PER FETUS: CPT

## 2024-01-26 PROCEDURE — 76819 FETAL BIOPHYS PROFIL W/O NST: CPT | Mod: 59

## 2024-01-26 PROCEDURE — 99213 OFFICE O/P EST LOW 20 MIN: CPT | Mod: TH,25

## 2024-01-26 RX ORDER — CLINDAMYCIN PHOSPHATE 20 MG/G
2 CREAM VAGINAL
Qty: 1 | Refills: 2 | Status: ACTIVE | COMMUNITY
Start: 2024-01-26 | End: 1900-01-01

## 2024-01-27 ENCOUNTER — INPATIENT (INPATIENT)
Facility: HOSPITAL | Age: 24
LOS: 1 days | Discharge: HOME | End: 2024-01-29
Attending: OBSTETRICS & GYNECOLOGY | Admitting: OBSTETRICS & GYNECOLOGY
Payer: COMMERCIAL

## 2024-01-27 VITALS — DIASTOLIC BLOOD PRESSURE: 55 MMHG | SYSTOLIC BLOOD PRESSURE: 109 MMHG | HEART RATE: 85 BPM

## 2024-01-27 DIAGNOSIS — O47.1 FALSE LABOR AT OR AFTER 37 COMPLETED WEEKS OF GESTATION: ICD-10-CM

## 2024-01-27 DIAGNOSIS — O26.899 OTHER SPECIFIED PREGNANCY RELATED CONDITIONS, UNSPECIFIED TRIMESTER: ICD-10-CM

## 2024-01-27 LAB
BASOPHILS # BLD AUTO: 0.02 K/UL — SIGNIFICANT CHANGE UP (ref 0–0.2)
BASOPHILS NFR BLD AUTO: 0.2 % — SIGNIFICANT CHANGE UP (ref 0–2)
BLD GP AB SCN SERPL QL: SIGNIFICANT CHANGE UP
EOSINOPHIL # BLD AUTO: 0.14 K/UL — SIGNIFICANT CHANGE UP (ref 0–0.5)
EOSINOPHIL NFR BLD AUTO: 1.4 % — SIGNIFICANT CHANGE UP (ref 0–6)
HCT VFR BLD CALC: 32.2 % — LOW (ref 34.5–45)
HGB BLD-MCNC: 9.8 G/DL — LOW (ref 11.5–15.5)
IMM GRANULOCYTES NFR BLD AUTO: 1.3 % — HIGH (ref 0–0.9)
LYMPHOCYTES # BLD AUTO: 1.24 K/UL — SIGNIFICANT CHANGE UP (ref 1–3.3)
LYMPHOCYTES # BLD AUTO: 12.4 % — LOW (ref 13–44)
MCHC RBC-ENTMCNC: 24.2 PG — LOW (ref 27–34)
MCHC RBC-ENTMCNC: 30.4 GM/DL — LOW (ref 32–36)
MCV RBC AUTO: 79.5 FL — LOW (ref 80–100)
MONOCYTES # BLD AUTO: 0.76 K/UL — SIGNIFICANT CHANGE UP (ref 0–0.9)
MONOCYTES NFR BLD AUTO: 7.6 % — SIGNIFICANT CHANGE UP (ref 2–14)
NEUTROPHILS # BLD AUTO: 7.67 K/UL — HIGH (ref 1.8–7.4)
NEUTROPHILS NFR BLD AUTO: 77.1 % — HIGH (ref 43–77)
PLATELET # BLD AUTO: 231 K/UL — SIGNIFICANT CHANGE UP (ref 150–400)
RBC # BLD: 4.05 M/UL — SIGNIFICANT CHANGE UP (ref 3.8–5.2)
RBC # FLD: 16.3 % — HIGH (ref 10.3–14.5)
WBC # BLD: 9.96 K/UL — SIGNIFICANT CHANGE UP (ref 3.8–10.5)
WBC # FLD AUTO: 9.96 K/UL — SIGNIFICANT CHANGE UP (ref 3.8–10.5)

## 2024-01-27 PROCEDURE — 59409 OBSTETRICAL CARE: CPT | Mod: U9

## 2024-01-27 RX ORDER — OXYTOCIN 10 UNIT/ML
VIAL (ML) INJECTION
Qty: 30 | Refills: 0 | Status: DISCONTINUED | OUTPATIENT
Start: 2024-01-27 | End: 2024-01-28

## 2024-01-27 RX ORDER — CHLORHEXIDINE GLUCONATE 213 G/1000ML
1 SOLUTION TOPICAL DAILY
Refills: 0 | Status: DISCONTINUED | OUTPATIENT
Start: 2024-01-27 | End: 2024-01-28

## 2024-01-27 RX ORDER — CITRIC ACID/SODIUM CITRATE 300-500 MG
30 SOLUTION, ORAL ORAL ONCE
Refills: 0 | Status: DISCONTINUED | OUTPATIENT
Start: 2024-01-27 | End: 2024-01-28

## 2024-01-27 RX ORDER — AMPICILLIN TRIHYDRATE 250 MG
1 CAPSULE ORAL EVERY 4 HOURS
Refills: 0 | Status: DISCONTINUED | OUTPATIENT
Start: 2024-01-27 | End: 2024-01-28

## 2024-01-27 RX ORDER — SODIUM CHLORIDE 9 MG/ML
1000 INJECTION, SOLUTION INTRAVENOUS
Refills: 0 | Status: DISCONTINUED | OUTPATIENT
Start: 2024-01-27 | End: 2024-01-28

## 2024-01-27 RX ORDER — OXYTOCIN 10 UNIT/ML
333.33 VIAL (ML) INJECTION
Qty: 20 | Refills: 0 | Status: COMPLETED | OUTPATIENT
Start: 2024-01-27 | End: 2024-01-27

## 2024-01-27 RX ORDER — AMPICILLIN TRIHYDRATE 250 MG
2 CAPSULE ORAL ONCE
Refills: 0 | Status: COMPLETED | OUTPATIENT
Start: 2024-01-27 | End: 2024-01-27

## 2024-01-27 RX ADMIN — Medication 200 GRAM(S): at 18:25

## 2024-01-27 RX ADMIN — Medication 108 GRAM(S): at 22:24

## 2024-01-27 RX ADMIN — SODIUM CHLORIDE 125 MILLILITER(S): 9 INJECTION, SOLUTION INTRAVENOUS at 22:24

## 2024-01-27 RX ADMIN — SODIUM CHLORIDE 125 MILLILITER(S): 9 INJECTION, SOLUTION INTRAVENOUS at 21:12

## 2024-01-27 RX ADMIN — SODIUM CHLORIDE 125 MILLILITER(S): 9 INJECTION, SOLUTION INTRAVENOUS at 17:43

## 2024-01-27 RX ADMIN — Medication 2 MILLIUNIT(S)/MIN: at 19:00

## 2024-01-27 NOTE — OB PROVIDER H&P - NSHPPHYSICALEXAM_GEN_ALL_CORE
Vital Signs Last 24 Hrs  T(C): 36.9 (27 Jan 2024 17:22), Max: 36.9 (27 Jan 2024 17:01)  T(F): 98.4 (27 Jan 2024 17:22), Max: 98.4 (27 Jan 2024 17:01)  HR: 85 (27 Jan 2024 17:22) (85 - 85)  BP: 109/55 (27 Jan 2024 17:22) (109/55 - 109/55)  RR: 16 (27 Jan 2024 17:22) (16 - 16)      Parameters below as of 27 Jan 2024 17:22  Patient On (Oxygen Delivery Method): room air    Gen: well-appearing, NAD  Neuro: A&Ox3  Resp: breathing comfortably on RA   Abd: nontender, gravid   VE: 3.5/60/-3, cephalic, intact  FHT: baseline 130, mod variability, +accels, -decels  [place]: infrequent ctx

## 2024-01-27 NOTE — OB RN TRIAGE NOTE - FALL HARM RISK - UNIVERSAL INTERVENTIONS
Bed in lowest position, wheels locked, appropriate side rails in place/Call bell, personal items and telephone in reach/Instruct patient to call for assistance before getting out of bed or chair/Non-slip footwear when patient is out of bed/[place] to call system/Physically safe environment - no spills, clutter or unnecessary equipment/Purposeful Proactive Rounding/Room/bathroom lighting operational, light cord in reach

## 2024-01-27 NOTE — OB RN PATIENT PROFILE - NSGBSSTATUS_OBGYN_ALL_OB
Carmen OT with [facility] calling to request verbal orders for the followin additional OT visit within 3 weeks for wheelchair needs     Routing to PCP to please review and advise on requested home care orders - thank you!     Callback to Carmen [phone] - ok to leave detailed VM (confidential)    Nuvia Briceno RN  [facility]   Positive

## 2024-01-27 NOTE — OB PROVIDER H&P - ASSESSMENT
23y  at 40.5w GA admitted in early labor.    - admission labs  - GBS ppx as indicated  - IVF  - cat 1 fht, continuous monitoring  - augment early labor w pitocin  - epi prn, consult anesthesia    discussed with Dr. Doan

## 2024-01-28 LAB
HCT VFR BLD CALC: 29.2 % — LOW (ref 34.5–45)
HGB BLD-MCNC: 8.7 G/DL — LOW (ref 11.5–15.5)
T PALLIDUM AB TITR SER: NEGATIVE — SIGNIFICANT CHANGE UP

## 2024-01-28 RX ORDER — PRAMOXINE HYDROCHLORIDE 150 MG/15G
1 AEROSOL, FOAM RECTAL EVERY 4 HOURS
Refills: 0 | Status: DISCONTINUED | OUTPATIENT
Start: 2024-01-28 | End: 2024-01-29

## 2024-01-28 RX ORDER — OXYTOCIN 10 UNIT/ML
41.67 VIAL (ML) INJECTION
Qty: 20 | Refills: 0 | Status: DISCONTINUED | OUTPATIENT
Start: 2024-01-28 | End: 2024-01-29

## 2024-01-28 RX ORDER — OXYCODONE HYDROCHLORIDE 5 MG/1
5 TABLET ORAL
Refills: 0 | Status: DISCONTINUED | OUTPATIENT
Start: 2024-01-28 | End: 2024-01-29

## 2024-01-28 RX ORDER — MAGNESIUM HYDROXIDE 400 MG/1
30 TABLET, CHEWABLE ORAL
Refills: 0 | Status: DISCONTINUED | OUTPATIENT
Start: 2024-01-28 | End: 2024-01-29

## 2024-01-28 RX ORDER — LANOLIN
1 OINTMENT (GRAM) TOPICAL EVERY 6 HOURS
Refills: 0 | Status: DISCONTINUED | OUTPATIENT
Start: 2024-01-28 | End: 2024-01-29

## 2024-01-28 RX ORDER — OXYCODONE HYDROCHLORIDE 5 MG/1
5 TABLET ORAL ONCE
Refills: 0 | Status: DISCONTINUED | OUTPATIENT
Start: 2024-01-28 | End: 2024-01-29

## 2024-01-28 RX ORDER — DIBUCAINE 1 %
1 OINTMENT (GRAM) RECTAL EVERY 6 HOURS
Refills: 0 | Status: DISCONTINUED | OUTPATIENT
Start: 2024-01-28 | End: 2024-01-29

## 2024-01-28 RX ORDER — AER TRAVELER 0.5 G/1
1 SOLUTION RECTAL; TOPICAL EVERY 4 HOURS
Refills: 0 | Status: DISCONTINUED | OUTPATIENT
Start: 2024-01-28 | End: 2024-01-29

## 2024-01-28 RX ORDER — BENZOCAINE 10 %
1 GEL (GRAM) MUCOUS MEMBRANE EVERY 6 HOURS
Refills: 0 | Status: DISCONTINUED | OUTPATIENT
Start: 2024-01-28 | End: 2024-01-29

## 2024-01-28 RX ORDER — DIPHENHYDRAMINE HCL 50 MG
25 CAPSULE ORAL EVERY 6 HOURS
Refills: 0 | Status: DISCONTINUED | OUTPATIENT
Start: 2024-01-28 | End: 2024-01-29

## 2024-01-28 RX ORDER — IBUPROFEN 200 MG
600 TABLET ORAL EVERY 6 HOURS
Refills: 0 | Status: COMPLETED | OUTPATIENT
Start: 2024-01-28 | End: 2024-12-26

## 2024-01-28 RX ORDER — ACETAMINOPHEN 500 MG
975 TABLET ORAL
Refills: 0 | Status: DISCONTINUED | OUTPATIENT
Start: 2024-01-28 | End: 2024-01-29

## 2024-01-28 RX ORDER — SIMETHICONE 80 MG/1
80 TABLET, CHEWABLE ORAL EVERY 4 HOURS
Refills: 0 | Status: DISCONTINUED | OUTPATIENT
Start: 2024-01-28 | End: 2024-01-29

## 2024-01-28 RX ORDER — TETANUS TOXOID, REDUCED DIPHTHERIA TOXOID AND ACELLULAR PERTUSSIS VACCINE, ADSORBED 5; 2.5; 8; 8; 2.5 [IU]/.5ML; [IU]/.5ML; UG/.5ML; UG/.5ML; UG/.5ML
0.5 SUSPENSION INTRAMUSCULAR ONCE
Refills: 0 | Status: DISCONTINUED | OUTPATIENT
Start: 2024-01-28 | End: 2024-01-29

## 2024-01-28 RX ORDER — KETOROLAC TROMETHAMINE 30 MG/ML
30 SYRINGE (ML) INJECTION ONCE
Refills: 0 | Status: DISCONTINUED | OUTPATIENT
Start: 2024-01-28 | End: 2024-01-28

## 2024-01-28 RX ORDER — FERROUS SULFATE 325(65) MG
325 TABLET ORAL DAILY
Refills: 0 | Status: DISCONTINUED | OUTPATIENT
Start: 2024-01-28 | End: 2024-01-29

## 2024-01-28 RX ORDER — IBUPROFEN 200 MG
600 TABLET ORAL EVERY 6 HOURS
Refills: 0 | Status: DISCONTINUED | OUTPATIENT
Start: 2024-01-28 | End: 2024-01-29

## 2024-01-28 RX ORDER — HYDROCORTISONE 1 %
1 OINTMENT (GRAM) TOPICAL EVERY 6 HOURS
Refills: 0 | Status: DISCONTINUED | OUTPATIENT
Start: 2024-01-28 | End: 2024-01-29

## 2024-01-28 RX ORDER — SODIUM CHLORIDE 9 MG/ML
3 INJECTION INTRAMUSCULAR; INTRAVENOUS; SUBCUTANEOUS EVERY 8 HOURS
Refills: 0 | Status: DISCONTINUED | OUTPATIENT
Start: 2024-01-28 | End: 2024-01-29

## 2024-01-28 RX ADMIN — Medication 1000 MILLIUNIT(S)/MIN: at 02:44

## 2024-01-28 RX ADMIN — Medication 1 TABLET(S): at 11:57

## 2024-01-28 RX ADMIN — Medication 975 MILLIGRAM(S): at 14:40

## 2024-01-28 RX ADMIN — Medication 30 MILLIGRAM(S): at 03:32

## 2024-01-28 RX ADMIN — Medication 600 MILLIGRAM(S): at 17:59

## 2024-01-28 RX ADMIN — Medication 30 MILLIGRAM(S): at 04:00

## 2024-01-28 RX ADMIN — Medication 600 MILLIGRAM(S): at 23:14

## 2024-01-28 RX ADMIN — Medication 325 MILLIGRAM(S): at 17:59

## 2024-01-28 RX ADMIN — Medication 975 MILLIGRAM(S): at 20:32

## 2024-01-28 RX ADMIN — Medication 975 MILLIGRAM(S): at 08:23

## 2024-01-28 RX ADMIN — MAGNESIUM HYDROXIDE 30 MILLILITER(S): 400 TABLET, CHEWABLE ORAL at 17:59

## 2024-01-28 RX ADMIN — Medication 600 MILLIGRAM(S): at 11:57

## 2024-01-28 NOTE — OB PROVIDER DELIVERY SUMMARY - NSSELHIDDEN_OBGYN_ALL_OB_FT
[NS_DeliveryAttending1_OBGYN_ALL_OB_FT:[id number]=],[NS_DeliveryAssist1_OBGYN_ALL_OB_FT:[id number]=]

## 2024-01-28 NOTE — OB PROVIDER DELIVERY SUMMARY - NSPROVIDERDELIVERYNOTE_OBGYN_ALL_OB_FT
CALLED PATIENT AND ADVISED THAT HIS LEVEMIR PT ASST IS HERE. HE VOICED UNDERSTANDING.  
 at 2:43 AM of a live male, Apgars 9/9. Delivered KORINA, no nuchal cord, clear fluid. Infant's head delivered with maternal expulsive efforts. Shoulders delivered without difficulty followed by the rest of the body. Nose and mouth were bulb suctioned. Cord clamped and cut after delay. Samples obtained. Baby handed to patient. Placenta delivered spontaneously, intact, 3VC. Fundus firm, minimal bleeding. Perineum and vagina inspected – small 2nd degree perineal laceration repaired with vicryl. EBL 123cc. Hemostasis noted. Pt tolerated procedure well, in stable condition, recovering in LDR. Infant in LDR. Instrument/sponge count correct x 2 and confirmed by nurse.

## 2024-01-28 NOTE — OB RN DELIVERY SUMMARY - NSSELHIDDEN_OBGYN_ALL_OB_FT
[NS_DeliveryAttending1_OBGYN_ALL_OB_FT:[id number]],[NS_DeliveryAssist1_OBGYN_ALL_OB_FT:[id number]=],[NS_DeliveryRN_OBGYN_ALL_OB_FT:[id number]=]

## 2024-01-28 NOTE — OB PROVIDER LABOR PROGRESS NOTE - NS_OBIHIFHRDETAILS_OBGYN_ALL_OB_FT
baseline 115, mod variability, +accels, -decels
baseline 120, moderate variability, +accels, -decels

## 2024-01-28 NOTE — OB PROVIDER DELIVERY SUMMARY - NSLOWPPHRISK_OBGYN_A_OB
No previous uterine incision/Toth Pregnancy/Less than or equal to 4 previous vaginal births/No known bleeding disorder/No history of postpartum hemorrhage/No other PPH risks indicated

## 2024-01-28 NOTE — OB RN DELIVERY SUMMARY - NS_LABORCHARACTER_OBGYN_ALL_OB
Augmentation of labor/External electronic FM Augmentation of labor/External electronic FM/Antibiotics in labor

## 2024-01-28 NOTE — OB PROVIDER LABOR PROGRESS NOTE - NS_SUBJECTIVE/OBJECTIVE_OBGYN_ALL_OB_FT
comfortable w epi in place    Vital Signs Last 24 Hrs  T(C): 36.7 (27 Jan 2024 21:28), Max: 36.9 (27 Jan 2024 17:01)  T(F): 98.06 (27 Jan 2024 21:28), Max: 98.4 (27 Jan 2024 17:01)  HR: 66 (27 Jan 2024 22:32) (66 - 97)  BP: 105/57 (27 Jan 2024 22:32) (103/54 - 123/60)  RR: 16 (27 Jan 2024 21:28) (16 - 16)  SpO2: 98% (27 Jan 2024 22:32) (98% - 100%)    Parameters below as of 27 Jan 2024 17:22  Patient On (Oxygen Delivery Method): room air
Pt is feeling more pressure and getting uncomfortable

## 2024-01-28 NOTE — OB RN DELIVERY SUMMARY - NS_SEPSISRSKCALC_OBGYN_ALL_OB_FT
EOS calculated successfully. EOS Risk Factor: 0.5/1000 live births ([facility] national incidence); GA=40w6d; Temp=98.4; ROM=4.4; GBS='Positive'; Antibiotics='No antibiotics or any antibiotics < 2 hrs prior to birth'   EOS calculated successfully. EOS Risk Factor: 0.5/1000 live births ([facility] national incidence); GA=40w6d; Temp=98.4; ROM=4.4; GBS='Positive'; Antibiotics='GBS specific antibiotics > 2 hrs prior to birth'

## 2024-01-28 NOTE — OB PROVIDER LABOR PROGRESS NOTE - ASSESSMENT
comfortable w epi in place  maternal VSS  cat 1 fht  AROM clear at time of exam  titrate pit as indicated per fht and contractions pattern  continue with TIN
FHT cat I  Cervical exam progressing   VSS  On pitocin and uptitrate per protocol

## 2024-01-29 ENCOUNTER — TRANSCRIPTION ENCOUNTER (OUTPATIENT)
Age: 24
End: 2024-01-29

## 2024-01-29 ENCOUNTER — NON-APPOINTMENT (OUTPATIENT)
Age: 24
End: 2024-01-29

## 2024-01-29 VITALS
SYSTOLIC BLOOD PRESSURE: 106 MMHG | DIASTOLIC BLOOD PRESSURE: 64 MMHG | RESPIRATION RATE: 18 BRPM | OXYGEN SATURATION: 99 % | HEART RATE: 68 BPM | TEMPERATURE: 98 F

## 2024-01-29 PROCEDURE — 36415 COLL VENOUS BLD VENIPUNCTURE: CPT

## 2024-01-29 PROCEDURE — 86900 BLOOD TYPING SEROLOGIC ABO: CPT

## 2024-01-29 PROCEDURE — 85014 HEMATOCRIT: CPT

## 2024-01-29 PROCEDURE — 85018 HEMOGLOBIN: CPT

## 2024-01-29 PROCEDURE — 86901 BLOOD TYPING SEROLOGIC RH(D): CPT

## 2024-01-29 PROCEDURE — 86780 TREPONEMA PALLIDUM: CPT

## 2024-01-29 PROCEDURE — 86850 RBC ANTIBODY SCREEN: CPT

## 2024-01-29 PROCEDURE — 85025 COMPLETE CBC W/AUTO DIFF WBC: CPT

## 2024-01-29 PROCEDURE — 59050 FETAL MONITOR W/REPORT: CPT

## 2024-01-29 RX ORDER — ACETAMINOPHEN 500 MG
3 TABLET ORAL
Qty: 270 | Refills: 0
Start: 2024-01-29 | End: 2024-02-27

## 2024-01-29 RX ORDER — IBUPROFEN 200 MG
1 TABLET ORAL
Qty: 120 | Refills: 0
Start: 2024-01-29 | End: 2024-02-27

## 2024-01-29 RX ADMIN — Medication 975 MILLIGRAM(S): at 02:16

## 2024-01-29 RX ADMIN — Medication 975 MILLIGRAM(S): at 08:58

## 2024-01-29 RX ADMIN — Medication 975 MILLIGRAM(S): at 09:28

## 2024-01-29 RX ADMIN — Medication 600 MILLIGRAM(S): at 05:17

## 2024-01-29 NOTE — DISCHARGE NOTE OB - NS MD DC FALL RISK RISK
For information on Fall & Injury Prevention, visit: [email or web address] OR  [email or web address] OR  https://www.cdc.gov/steadi/patient.html

## 2024-01-29 NOTE — PROGRESS NOTE ADULT - ASSESSMENT
A/P:   23y  now PPD#1 s/p spontaneous vaginal delivery at 40w5d gestation, uncomplicated.  -Vital signs stable  -Hgb: 9.8 -> 8.7, asymptomatic   -Voiding, tolerating PO, bowel function nml   -Advance care as tolerated   -Continue routine postpartum care and education  -Healthy male infant,declines circumcision  -Dispo: Pt is stable for discharge to home pending attending approval.

## 2024-01-29 NOTE — DISCHARGE NOTE OB - CARE PROVIDER_API CALL
Clinton Garza  Obstetrics and Gynecology  [address], [address]  [place] [address]  Phone: [phone]  Fax: [phone]  Follow Up Time:

## 2024-01-29 NOTE — PROGRESS NOTE ADULT - ATTENDING COMMENTS
Agree with above assessment and plan.  Pt is a 23y  now PPD#1 s/p spontaneous vaginal delivery at 40w5d gestation, uncomplicated.  Pt is doing well and is without complaints.  Her pain is well controlled and her bleeding is not heavy  Continue routine PP care  Discharge planning.    EN Valenzuela

## 2024-01-29 NOTE — PROGRESS NOTE ADULT - SUBJECTIVE AND OBJECTIVE BOX
PP #2 patient without complaints ,breast feeding without difficulty    afeb, vss    abd soft and nt, ff ,min lochia, tr edema    imp stable for dc home   discharge instructions given, con"t pnv/slow fe qd  rt office 6 weeks
ARTEM SANDOVAL is a 23y  now PPD#1 s/p spontaneous vaginal delivery at 40w5d gestation, uncomplicated.    S:    The patient has no complaints.  Pain controlled with current treatment regimen.   She is ambulating without difficulty and tolerating PO.   + flatus/-BM/+ voiding   She endorses appropriate lochia, which is decreasing.   She is breastfeeding without difficulty.   Denies HA, CP, SOB, leg pain    O:    T(C): 36.9 (24 @ 04:57), Max: 36.9 (24 @ 08:01)  HR: 68 (24 @ 04:57) (68 - 80)  BP: 106/64 (24 @ 04:57) (100/65 - 120/64)  RR: 18 (24 @ 04:57) (18 - 18)  SpO2: 99% (24 @ 04:57) (97% - 99%)    Gen: NAD, AOx3  Breast: Nontender, non-engorged   Abdomen:  Soft, non-tender, non-distended  Uterus:  Fundus firm below umbilicus  VE:  +Lochia  Ext:  Non-tender and non-edematous                          8.7    x     )-----------( x        ( 2024 12:05 )             29.2

## 2024-01-29 NOTE — DISCHARGE NOTE OB - PATIENT PORTAL LINK FT
You can access the FollowMyHealth Patient Portal offered by [facility] by registering at the following website: [email or web address]. By joining [facility]’s FollowMyHealth portal, you will also be able to view your health information using other applications (apps) compatible with our system.

## 2024-01-29 NOTE — DISCHARGE NOTE OB - CARE PLAN
1 Principal Discharge DX:	Normal delivery  Assessment and plan of treatment:	Patient should transition to regular activity level. Resume regular diet. Patient should follow up with her OB for postpartum checkup in 2-3 weeks. Patient should call her doctor sooner if she develops fever or uncontrolled vaginal bleeding. Take medications as directed. Exclusive breast feeding for the first 6 months is recommended. Nothing per vagina for 6 weeks (incl. sex, douching, etc). If you have additional concerns, please inform your provider.

## 2024-01-30 ENCOUNTER — APPOINTMENT (OUTPATIENT)
Dept: OBGYN | Facility: HOSPITAL | Age: 24
End: 2024-01-30

## 2024-02-02 ENCOUNTER — APPOINTMENT (OUTPATIENT)
Dept: OBGYN | Facility: CLINIC | Age: 24
End: 2024-02-02

## 2024-03-11 ENCOUNTER — APPOINTMENT (OUTPATIENT)
Dept: OBGYN | Facility: CLINIC | Age: 24
End: 2024-03-11
Payer: MEDICAID

## 2024-03-11 VITALS
WEIGHT: 158 LBS | HEIGHT: 62 IN | SYSTOLIC BLOOD PRESSURE: 97 MMHG | BODY MASS INDEX: 29.08 KG/M2 | DIASTOLIC BLOOD PRESSURE: 62 MMHG

## 2024-03-11 PROCEDURE — 99213 OFFICE O/P EST LOW 20 MIN: CPT | Mod: TH

## 2024-03-11 NOTE — HISTORY OF PRESENT ILLNESS
[Postpartum Follow Up] : postpartum follow up [Delivery Date: ___] : on [unfilled] [] : delivered by vaginal delivery [Male] : Delivery History: baby boy [Wt. ___] : weighing [unfilled] [Breastfeeding] : currently nursing [Complications:___] : no complications [BF with Difficulty] : nursing without difficulty [Resumed Menses] : has not resumed her menses [Resumed [place]] : has not resumed intercourse [Intended Contraception] : the patient does not intended to use contraception postpartum [BreastFeeding Problems] : breastfeeding problems [Back to Normal] : is back to normal in size [Clean/Dry/Intact] : clean, dry and intact [Normal] : the vagina was normal [Cervix Sample Taken] : cervical sample not taken for a Pap smear [Healing Well] : is healing well [Not Done] : Examination of breasts not done [Doing Well] : is doing well [No Sign of Infection] : is showing no signs of infection [None] : None [Excellent Pain Control] : has excellent pain control [FreeTextEntry8] : Patient without complaints desires information on vasectomy for her  [de-identified] : Doing well postpartum  discussed birth control options [de-identified] : Consult urology for vasectomy Dr. Guajardo, continue prenatal vitamin return in 3 months for annual checkup and Pap

## 2024-04-02 NOTE — OB RN PATIENT PROFILE - WEIGHT IN KG
Medication: rosuvastatin passed protocol.   Last office visit date: 10/2/23  Next appointment scheduled?: Yes   Number of refills given: 0    Upcoming appointment scheduled on: 4/8/2024   Per last office visit, patient is to follow up 4/2/24.   Last refill on: 1/2/24      
78.9

## 2024-06-14 ENCOUNTER — APPOINTMENT (OUTPATIENT)
Dept: OBGYN | Facility: CLINIC | Age: 24
End: 2024-06-14

## 2024-09-14 NOTE — OB RN PATIENT PROFILE - FUNCTIONAL ASSESSMENT - BASIC MOBILITY SCORE.
S/p laparoscopic cholecystectomy on 8/21/2024 with clinical improvement of epigastric discomfort and downtrending LFTs.    - defer GI management to primary and GI colleagues, however agree with monitoring  - no need for NPO from a surgical perspective, agree with slow advancement and low-fat diet  - pain and nausea control per primary team  - remainder of care per primary team  - no need for outpatient follow-up with General Surgery team as patient's surgical sites appear to be healing well   24

## 2024-09-23 NOTE — OB RN PATIENT PROFILE - NS_PRENATALCARE_OBGYN_ALL_OB
No care due was identified.  [facility] Embedded Care Due Messages. Reference number: [id number].   9/23/2024 12:54:20 PM CDT   Yes

## 2024-11-25 NOTE — OB PROVIDER H&P - NSPREVIOUSLIVEBIR_OBGYN_ALL_OB
[FreeTextEntry1] : Martha is a 15Y female who presents with her father for evaluation of right knee injury sustained 1 week ago. She was playing basketball and landed directly on her right knee. She immediately reported pain however denied any swelling. The pain is localized to the medial and lateral aspect of her knee cap. Denies any subluxation or dislocation event. Denies any radiating pain, numbness or any tingling sensation. Denies any need for pain medication at home. Here for orthopedic evaluation and management.   The patient's HPI was reviewed thoroughly with patient and parent. The patient's parent has acted as an independent historian regarding the above information due to the unreliable nature of the history obtained from the patient. 
No